# Patient Record
Sex: MALE | Race: WHITE
[De-identification: names, ages, dates, MRNs, and addresses within clinical notes are randomized per-mention and may not be internally consistent; named-entity substitution may affect disease eponyms.]

---

## 2018-10-04 ENCOUNTER — HOSPITAL ENCOUNTER (INPATIENT)
Dept: HOSPITAL 38 - CC.ED | Age: 83
LOS: 5 days | Discharge: HOME | DRG: 291 | End: 2018-10-09
Attending: FAMILY MEDICINE | Admitting: PHYSICIAN ASSISTANT
Payer: MEDICARE

## 2018-10-04 DIAGNOSIS — Z98.61: ICD-10-CM

## 2018-10-04 DIAGNOSIS — F03.90: ICD-10-CM

## 2018-10-04 DIAGNOSIS — E11.22: ICD-10-CM

## 2018-10-04 DIAGNOSIS — Z51.5: ICD-10-CM

## 2018-10-04 DIAGNOSIS — Z79.899: ICD-10-CM

## 2018-10-04 DIAGNOSIS — I48.92: ICD-10-CM

## 2018-10-04 DIAGNOSIS — J18.9: ICD-10-CM

## 2018-10-04 DIAGNOSIS — J44.0: ICD-10-CM

## 2018-10-04 DIAGNOSIS — E87.5: ICD-10-CM

## 2018-10-04 DIAGNOSIS — Z91.013: ICD-10-CM

## 2018-10-04 DIAGNOSIS — I50.23: Primary | ICD-10-CM

## 2018-10-04 DIAGNOSIS — I48.91: ICD-10-CM

## 2018-10-04 DIAGNOSIS — H35.30: ICD-10-CM

## 2018-10-04 DIAGNOSIS — Z79.82: ICD-10-CM

## 2018-10-04 DIAGNOSIS — J69.0: ICD-10-CM

## 2018-10-04 DIAGNOSIS — J44.1: ICD-10-CM

## 2018-10-04 DIAGNOSIS — Z66: ICD-10-CM

## 2018-10-04 DIAGNOSIS — Z87.891: ICD-10-CM

## 2018-10-04 DIAGNOSIS — N18.9: ICD-10-CM

## 2018-10-04 LAB
CHLORIDE SERPL-SCNC: 105 MEQ/L (ref 98–106)
SODIUM SERPL-SCNC: 141 MEQ/L (ref 136–145)

## 2018-10-04 PROCEDURE — G0365 VESSEL MAPPING HEMO ACCESS: HCPCS

## 2018-10-04 RX ADMIN — MOMETASONE FUROATE AND FORMOTEROL FUMARATE DIHYDRATE SCH PUFF: 200; 5 AEROSOL RESPIRATORY (INHALATION) at 20:20

## 2018-10-04 RX ADMIN — CLINDAMYCIN PHOSPHATE SCH MLS/HR: 6 INJECTION, SOLUTION INTRAVENOUS at 20:12

## 2018-10-04 RX ADMIN — CLINDAMYCIN PHOSPHATE SCH MLS/HR: 6 INJECTION, SOLUTION INTRAVENOUS at 14:48

## 2018-10-04 NOTE — EDM.PDOC
ED HPI GENERAL MEDICAL PROBLEM





- General


Chief Complaint: General


Stated Complaint: SOB


Time Seen by Provider: 10/04/18 10:00


Source of Information: Reports: Patient, Family, Nursing Home Records


History Limitations: Reports: Respiratory Distress





- History of Present Illness


INITIAL COMMENTS - FREE TEXT/NARRATIVE: 





Patient presents per [facility] with increased work of breathing, shortness of 

breath, low oxygen sats.  He has been more short of breath over the last week.  

On Friday, was started on prednisone and a Zpack which have been completed.  

Switched to DuoNebs for 7 days.  He is chronically on 2 liters of oxygen but 

has required 5 liters over the last week.  Today, he leaned over on his right 

side and had increased shortness of breath.  Unable to get his sats greater 

than 80% with any movement at all.  EMS gave him a DuoNeb and switched him to 

CPAP on 7 liters.  Was able to get adequate sats in the mid 90s with that.  

Patient is in respiratory distress with tachypnea on arrival but sats are 

adequate with CPAP. 


Onset: Gradual


Duration: Day(s):


Location: Reports: Chest


Severity: Severe


Improves with: Reports: Rest


Worsens with: Reports: Movement


Associated Symptoms: Reports: Cough, Shortness of Breath, Weakness.  Denies: 

Confusion, Chest Pain, Fever/Chills, Nausea/Vomiting


Treatments PTA: Reports: Breathing Treatments, Oxygen





- Related Data


 Allergies











Allergy/AdvReac Type Severity Reaction Status Date / Time


 


MUSHROOMS Allergy  Cannot Uncoded 10/04/18 09:45





   Remember  


 


Seafood Allergy  Airway Uncoded 10/04/18 09:45





   Tightness  











Home Meds: 


 Home Meds





Aspirin [Halfprin] 81 mg PO DAILY 12/12/14 [History]


Multivitamin [Multi-Vitamin Daily] 1 tab PO DAILY 12/12/14 [History]


Albuterol Sulfate [Ventolin Hfa] 2 inh INH QID 04/21/16 [History]


Furosemide 20 mg PO DAILY 04/21/16 [History]


Albuterol/Ipratropium [DuoNeb 3.0-0.5 MG/3 ML] 3 ml NEB QIDRT #28 neb 04/28/16 [

Rx]











Past Medical History


HEENT History: Reports: Macular Degeneration


Cardiovascular History: Reports: Afib


Respiratory History: Reports: COPD


Gastrointestinal History: Reports: Cholelithiasis


Neurological History: Reports: Other (See Below)


Other Neuro History: DEMENTIA


Endocrine/Metabolic History: Reports: Diabetes, Type II





- Past Surgical History


HEENT Surgical History: Reports: Cataract Surgery


Cardiovascular Surgical History: Reports: Percutaneous Transluminal Angioplasty


GI Surgical History: Reports: Cholecystectomy, Colonoscopy, EGD





Social & Family History





- Family History


Family Medical History: Noncontributory





- Tobacco Use


Smoking Status *Q: Former Smoker


Years of Tobacco use: 70


Used Tobacco, but Quit: Yes


Month/Year Tobacco Last Used: 1996





ED ROS GENERAL





- Review of Systems


Review Of Systems: See Below


Constitutional: Reports: Weakness.  Denies: Fever, Chills, Malaise


HEENT: Denies: Ear Pain, Rhinitis, Throat Pain


Respiratory: Reports: Shortness of Breath, Cough


Cardiovascular: Denies: Edema


Endocrine: Reports: Fatigue


GI/Abdominal: Denies: Abdominal Pain, Nausea, Vomiting


: Reports: No Symptoms


Musculoskeletal: Reports: No Symptoms


Skin: Reports: No Symptoms





ED EXAM, GENERAL





- Physical Exam


Exam: See Below


Exam Limited By: No Limitations


General Appearance: Alert, WD/WN, Severe Distress


Ears: Normal External Exam, Normal TMs


Nose: Normal Inspection, Normal Mucosa, No Blood


Throat/Mouth: Normal Inspection, Normal Oropharynx


Head: Normocephalic


Neck: Normal Inspection, Supple


Respiratory/Chest: Respiratory Distress, Decreased Breath Sounds, Rales, 

Wheezing


Cardiovascular: No Edema, Other (atrial flutter noted on cardiac monitor per EMS

, confirmed with EKG)


GI/Abdominal: Normal Bowel Sounds, Soft, Non-Tender


Extremities: Normal Inspection


Neurological: Alert, Oriented


Skin Exam: Warm, Dry





Course





- Vital Signs


Last Recorded V/S: 


 Last Vital Signs











Temp  98.3 F   10/04/18 10:10


 


Pulse  81   10/04/18 10:37


 


Resp  18   10/04/18 10:37


 


BP  123/75   10/04/18 10:10


 


Pulse Ox  92 L  10/04/18 10:37














- Orders/Labs/Meds


Orders: 


 Active Orders 24 hr











 Category Date Time Status


 


 RT Aerosol Therapy [RC] ASDIRECTED Care  10/04/18 10:10 Active


 


 RT Aerosol Therapy [RC] ASDIRECTED Care  10/04/18 10:13 Active


 


 Chest 1V Frontal [CR] Stat Exams  10/04/18 10:03 Taken


 


 methylPREDNISolone Sod Succ [Solu-MEDROL] Med  10/04/18 10:15 Active





 125 mg IVPUSH STAT   








 Medication Orders





Methylprednisolone Sodium Succinate (Solu-Medrol)  125 mg IVPUSH STAT AMANDO


   Last Admin: 10/04/18 10:12  Dose: 125 mg








Labs: 


 Laboratory Tests











  10/04/18 10/04/18 10/04/18 Range/Units





  10:17 10:17 10:17 


 


WBC  13.2 H    (5.0-10.0)  10^3/uL


 


RBC  4.24 L    (4.50-6.00)  10^6/uL


 


Hgb  12.2 L    (14.0-18.0)  g/dL


 


Hct  39.7 L    (40.0-54.0)  %


 


MCV  93.6    (82.0-94.0)  fL


 


MCH  28.8    (27.0-32.0)  pg


 


MCHC  30.7 L    (33.0-38.0)  g/dL


 


RDW Coeff of Tammy  15.7 H    (11.0-15.0)  %


 


Plt Count  316    (150-400)  10^3/uL


 


Add Manual Diff  Yes    


 


Neutrophils % (Manual)  93 H    (35-85)  %


 


Band Neutrophils %  1    (0-5)  %


 


Lymphocytes % (Manual)  3 L    (21-55)  %


 


Monocytes % (Manual)  2    (2-12)  %


 


Eosinophils % (Manual)  1    (0-5)  %


 


Absolute Neutrophils  12.41 H    (1.80-7.00)  10^3/uL


 


Lymphocytes # (Manual)  0.40 L    (1.00-4.80)  10^3/uL


 


Monocytes # (Manual)  0.26    (0.00-0.80)  10^3/uL


 


Eosinophils # (Manual)  0.13    (0.00-0.45)  10^3/uL


 


D-Dimer, Quantitative   3.74 H   (0.00-0.50)  


 


Sodium    141  (136-145)  mEq/L


 


Potassium    5.4 H  (3.5-5.0)  mEq/L


 


Chloride    105  ()  mEq/L


 


Carbon Dioxide    29  (21-32)  mmol/L


 


BUN    79 H* D  (7-18)  mg/dL


 


Creatinine    2.2 H  (0.7-1.3)  mg/dL


 


Est Cr Clr Drug Dosing    17.92  mL/min


 


Estimated GFR (MDRD)    28 L  (>=60)  mL/min


 


Glucose    104 H D  (75-99)  mg/dL


 


Calcium    9.0  (8.4-10.1)  mg/dL


 


Total Bilirubin    1.1 H  (0.0-1.0)  mg/dL


 


AST    37  (15-37)  U/L


 


ALT    72  (12-78)  U/L


 


Alkaline Phosphatase    125 H  ()  U/L


 


Lactate Dehydrogenase    236 H  (100-190)  U/L


 


Troponin I    < 0.017  (0.00-0.06)  ng/mL


 


C-Reactive Protein    16.3 H  (0.2-0.8)  mg/dL


 


NT-Pro-B Natriuret Pep    8195 H  (0-1000)  pg/mL


 


Total Protein    7.9  (6.4-8.2)  g/dL


 


Albumin    3.2 L  (3.4-5.0)  g/dL











Meds: 


Medications











Generic Name Dose Route Start Last Admin





  Trade Name Anu  PRN Reason Stop Dose Admin


 


Methylprednisolone Sodium Succinate  125 mg  10/04/18 10:15  10/04/18 10:12





  Solu-Medrol  IVPUSH   125 mg





  STAT AMANDO   Administration





     





     





     





     














Discontinued Medications














Generic Name Dose Route Start Last Admin





  Trade Name Anu  PRN Reason Stop Dose Admin


 


Albuterol/Ipratropium  Confirm  10/04/18 09:37  10/04/18 10:11





  Duoneb 3.0-0.5 Mg/3 Ml  Administered  10/04/18 09:38  Not Given





  Dose   





  3 ml   





  .ROUTE   





  .STK-MED ONE   





     





     





     





     


 


Albuterol/Ipratropium  3 ml  10/04/18 10:09  10/04/18 10:13





  Duoneb 3.0-0.5 Mg/3 Ml  NEB  10/04/18 10:10  3 ml





  ONETIME ONE   Administration





     





     





     





     


 


Albuterol/Ipratropium  3 ml  10/04/18 10:13  10/04/18 11:06





  Duoneb 3.0-0.5 Mg/3 Ml  NEB  10/04/18 10:14  3 ml





  ONETIME ONE   Administration





     





     





     





     


 


Furosemide  40 mg  10/04/18 11:04  10/04/18 11:09





  Lasix  IVPUSH  10/04/18 11:05  40 mg





  ONETIME ONE   Administration





     





     





     





     


 


Methylprednisolone Sodium Succinate  Confirm  10/04/18 09:49  10/04/18 10:11





  Solu-Medrol  Administered  10/04/18 09:50  Not Given





  Dose   





  125 mg   





  .ROUTE   





  .STK-MED ONE   





     





     





     





     














- Re-Assessments/Exams


Free Text/Narrative Re-Assessment/Exam: 





10/04/18 


Patient given DuoNeb and IV solu Medrol on arrival.  Able to wean him down to 5 

liters and maintains sats at 90-92%.  





Labs reviewed, chest xray.   Show elevated ProBNP, elevated D-Dimer, creatinine 

2.2.  CRP elevated at 16.  WBC 13.2.  D-Dimer 3.74.


Chest xray shows venous congestion.  





Discussed status with son Tylor, questioned if they would like the patient 

transferred due to CHF, questionable PE, kidney failure, hyperkalemia, and 

respiratory distress.  Patient remains adamant about being a DNR, does not want 

intubation.  Family would like treated here if possible.  








Departure





- Departure


Time of Disposition: 12:48


Disposition: Admitted As Inpatient 66


Condition: Undetermined


Clinical Impression: 


 CHF, Congestive heart failure





COPD (chronic obstructive pulmonary disease)


Qualifiers:


 COPD type: COPD with acute exacerbation Qualified Code(s): J44.1 - Chronic 

obstructive pulmonary disease with (acute) exacerbation





Pneumonia


Qualifiers:


 Pneumonia type: due to unspecified organism Laterality: right Lung location: 

lower lobe of lung Qualified Code(s): J18.9 - Pneumonia, unspecified organism








- Discharge Information


*PRESCRIPTION DRUG MONITORING PROGRAM REVIEWED*: No


*COPY OF PRESCRIPTION DRUG MONITORING REPORT IN PATIENT PATIENCE: No


Forms:  ED Department Discharge





- Problem List & Annotations


(1) Pneumonia


SNOMED Code(s): 368148373


   Code(s): J18.9 - PNEUMONIA, UNSPECIFIED ORGANISM   Status: Acute   Priority: 

High   Current Visit: Yes   


Qualifiers: 


   Pneumonia type: aspiration pneumonia   Laterality: bilateral   Lung location

: lower lobe of lung   Qualified Code(s): J18.9 - Pneumonia, unspecified 

organism   





(2) COPD (chronic obstructive pulmonary disease)


SNOMED Code(s): 62392805


   Code(s): J44.9 - CHRONIC OBSTRUCTIVE PULMONARY DISEASE, UNSPECIFIED   Status

: Acute   Priority: High   Current Visit: Yes   


Qualifiers: 


   COPD type: COPD with acute exacerbation   Qualified Code(s): J44.1 - Chronic 

obstructive pulmonary disease with (acute) exacerbation   





(3) CHF, Congestive heart failure


SNOMED Code(s): 63959372


   Code(s): I50.9 - HEART FAILURE, UNSPECIFIED   Status: Acute   Priority: High

   Current Visit: Yes   Annotation/Comment:: Acute on Chronic Systolic 

Congestive Heart Failure


   





- Problem List Review


Problem List Initiated/Reviewed/Updated: Yes





- My Orders


Last 24 Hours: 


My Active Orders





10/04/18 10:03


Chest 1V Frontal [CR] Stat 





10/04/18 10:10


RT Aerosol Therapy [RC] ASDIRECTED 





10/04/18 10:13


RT Aerosol Therapy [RC] ASDIRECTED 





10/04/18 10:15


methylPREDNISolone Sod Succ [Solu-MEDROL]   125 mg IVPUSH STAT 














- Assessment/Plan


Admission H&P: Please use this note as an admission H&P


Last 24 Hours: 


My Active Orders





10/04/18 10:03


Chest 1V Frontal [CR] Stat 





10/04/18 10:10


RT Aerosol Therapy [RC] ASDIRECTED 





10/04/18 10:13


RT Aerosol Therapy [RC] ASDIRECTED 





10/04/18 10:15


methylPREDNISolone Sod Succ [Solu-MEDROL]   125 mg IVPUSH STAT 











Assessment:: 





Palliative Care Patient


Acute on Chronic CHF- systolic


COPD with acute exacerbation


Question PE


Question Aspiration Pneumonia


Hyperkalemia


CKD


Plan: 





Palliative care patient admitted to inpatient care to Dr. Anderson.  IV Lasix for 

acute on chronic CHF.  Will cover him with IV Cleocin for concerns of 

aspiration pneumonia.  Lovenox SQ for questionable PE.  Duplex US of his legs 

to rule out clot as creatinine too high in order to do CTA of chest.  Chronic 

Kidney disease.

## 2018-10-05 LAB
CHLORIDE SERPL-SCNC: 101 MEQ/L (ref 98–106)
SODIUM SERPL-SCNC: 138 MEQ/L (ref 136–145)

## 2018-10-05 RX ADMIN — CLINDAMYCIN PHOSPHATE SCH MLS/HR: 6 INJECTION, SOLUTION INTRAVENOUS at 13:27

## 2018-10-05 RX ADMIN — CLINDAMYCIN PHOSPHATE SCH MLS/HR: 6 INJECTION, SOLUTION INTRAVENOUS at 19:49

## 2018-10-05 RX ADMIN — INSULIN DETEMIR SCH UNITS: 100 INJECTION, SOLUTION SUBCUTANEOUS at 07:46

## 2018-10-05 RX ADMIN — MOMETASONE FUROATE AND FORMOTEROL FUMARATE DIHYDRATE SCH PUFF: 200; 5 AEROSOL RESPIRATORY (INHALATION) at 19:50

## 2018-10-05 RX ADMIN — CLINDAMYCIN PHOSPHATE SCH MLS/HR: 6 INJECTION, SOLUTION INTRAVENOUS at 07:31

## 2018-10-05 RX ADMIN — CLINDAMYCIN PHOSPHATE SCH MLS/HR: 6 INJECTION, SOLUTION INTRAVENOUS at 02:15

## 2018-10-05 RX ADMIN — POLYVINYL ALCOHOL SCH DROP: 14 SOLUTION/ DROPS OPHTHALMIC at 07:40

## 2018-10-05 RX ADMIN — MOMETASONE FUROATE AND FORMOTEROL FUMARATE DIHYDRATE SCH PUFF: 200; 5 AEROSOL RESPIRATORY (INHALATION) at 07:36

## 2018-10-05 NOTE — PCM.PN
- General Info


Date of Service: 10/05/18


Admission Dx/Problem (Free Text): 





Acute on Chronic CHF


Pneumonia


COPD Exacerbation


Elevated D-Dimer


Palliative Care patient


Functional Status: Reports: Pain Controlled, Tolerating Diet, Other (zuniga cath 

intact, draining bloody urine).  Denies: Ambulating





- Review of Systems


General: Reports: Weakness, Fatigue.  Denies: Fever


HEENT: Reports: No Symptoms


Pulmonary: Reports: Shortness of Breath, Cough


Cardiovascular: Denies: Chest Pain, Edema, Lightheadedness


Gastrointestinal: Denies: Abdominal Pain, Nausea, Vomiting


Genitourinary: Reports: Hematuria, Other (intact catheter)


Musculoskeletal: Reports: No Symptoms


Skin: Reports: No Symptoms


Neurological: Reports: Confusion, Weakness


Psychiatric: Reports: No Symptoms





- Patient Data


Vitals - Most Recent: 


 Last Vital Signs











Temp  98.1 F   10/05/18 11:49


 


Pulse  70   10/05/18 11:49


 


Resp  20   10/05/18 11:49


 


BP  124/46 L  10/05/18 11:49


 


Pulse Ox  95   10/05/18 11:49











Weight - Most Recent: 140 lb 9.6 oz


I&O - Last 24 Hours: 


 Intake & Output











 10/04/18 10/05/18 10/05/18





 22:59 06:59 14:59


 


Intake Total 100 650 250


 


Output Total 800 600 250


 


Balance -700 50 0











Lab Results Last 24 Hours: 


 Laboratory Results - last 24 hr











  10/04/18 10/05/18 10/05/18 Range/Units





  21:53 06:50 06:50 


 


WBC   13.9 H   (5.0-10.0)  10^3/uL


 


RBC   3.86 L   (4.50-6.00)  10^6/uL


 


Hgb   11.1 L   (14.0-18.0)  g/dL


 


Hct   35.4 L   (40.0-54.0)  %


 


MCV   91.7   (82.0-94.0)  fL


 


MCH   28.8   (27.0-32.0)  pg


 


MCHC   31.4 L   (33.0-38.0)  g/dL


 


RDW Coeff of Tammy   15.0   (11.0-15.0)  %


 


Plt Count   296   (150-400)  10^3/uL


 


Add Manual Diff   Yes   


 


Neutrophils % (Manual)   92 H   (35-85)  %


 


Lymphocytes % (Manual)   3 L   (21-55)  %


 


Monocytes % (Manual)   5   (2-12)  %


 


Sodium    138  (136-145)  mEq/L


 


Potassium    4.6  (3.5-5.0)  mEq/L


 


Chloride    101  ()  mEq/L


 


Carbon Dioxide    28  (21-32)  mmol/L


 


BUN    87 H*  (7-18)  mg/dL


 


Creatinine    2.3 H  (0.7-1.3)  mg/dL


 


Est Cr Clr Drug Dosing    17.14  mL/min


 


Estimated GFR (MDRD)    27 L  (>=60)  mL/min


 


Glucose    199 H D  (75-99)  mg/dL


 


POC Glucose  253 H    ()  mg/dl


 


Calcium    8.9  (8.4-10.1)  mg/dL


 


C-Reactive Protein    16.4 H  (0.2-0.8)  mg/dL














  10/05/18 10/05/18 Range/Units





  07:21 11:39 


 


WBC    (5.0-10.0)  10^3/uL


 


RBC    (4.50-6.00)  10^6/uL


 


Hgb    (14.0-18.0)  g/dL


 


Hct    (40.0-54.0)  %


 


MCV    (82.0-94.0)  fL


 


MCH    (27.0-32.0)  pg


 


MCHC    (33.0-38.0)  g/dL


 


RDW Coeff of Tammy    (11.0-15.0)  %


 


Plt Count    (150-400)  10^3/uL


 


Add Manual Diff    


 


Neutrophils % (Manual)    (35-85)  %


 


Lymphocytes % (Manual)    (21-55)  %


 


Monocytes % (Manual)    (2-12)  %


 


Sodium    (136-145)  mEq/L


 


Potassium    (3.5-5.0)  mEq/L


 


Chloride    ()  mEq/L


 


Carbon Dioxide    (21-32)  mmol/L


 


BUN    (7-18)  mg/dL


 


Creatinine    (0.7-1.3)  mg/dL


 


Est Cr Clr Drug Dosing    mL/min


 


Estimated GFR (MDRD)    (>=60)  mL/min


 


Glucose    (75-99)  mg/dL


 


POC Glucose  190 H  251 H  ()  mg/dl


 


Calcium    (8.4-10.1)  mg/dL


 


C-Reactive Protein    (0.2-0.8)  mg/dL











Med Orders - Current: 


 Current Medications





Acetaminophen (Tylenol)  650 mg PO Q4H PRN


   PRN Reason: Pain (Mild 1-3)/fever


Acetaminophen (Tylenol Extra Strength)  1,000 mg PO BID [facility]


   Last Admin: 10/05/18 07:36 Dose:  1,000 mg


Albuterol/Ipratropium (Duoneb 3.0-0.5 Mg/3 Ml)  3 ml NEB QIDRT [facility]


   Last Admin: 10/05/18 11:37 Dose:  3 ml


Amlodipine Besylate (Norvasc)  5 mg PO BEDTIME [facility]


   Last Admin: 10/04/18 20:16 Dose:  5 mg


Artificial Tears (Liquitears 1.4% Ophth Soln)  0 ml EYEBOTH DAILY [facility]


   Last Admin: 10/05/18 07:40 Dose:  1 drop


Aspirin (Halfprin)  81 mg PO DAILY [facility]


   Last Admin: 10/05/18 07:34 Dose:  81 mg


Bisacodyl (Dulcolax)  5 mg PO DAILY PRN


   PRN Reason: Constipation


Enoxaparin Sodium (Lovenox)  70 mg SUBCUT DAILY [facility]


   Last Admin: 10/05/18 07:34 Dose:  70 mg


Furosemide (Lasix)  40 mg IVPUSH DAILY [facility]


Clindamycin Phosphate 300 mg/ (Premix)  50 mls @ 100 mls/hr IV Q6H [facility]


   Last Admin: 10/05/18 13:27 Dose:  100 mls/hr


Insulin Detemir (Levemir)  22 unit SUBCUT DAILY [facility]


   Last Admin: 10/05/18 07:46 Dose:  22 units


Mometasone Furoate/Formoterol Fumar (Dulera 200-5 Mcg)  2 puff IH BIDRT [facility]


   Last Admin: 10/05/18 07:36 Dose:  2 puff


Sodium Chloride (Saline Flush)  10 ml FLUSH ASDIRECTED PRN


   PRN Reason: Keep Vein Open





Discontinued Medications





Albuterol/Ipratropium (Duoneb 3.0-0.5 Mg/3 Ml) Confirm Administered Dose 3 ml 

.ROUTE .STK-MED ONE


   Stop: 10/04/18 09:38


   Last Admin: 10/04/18 10:11 Dose:  Not Given


Albuterol/Ipratropium (Duoneb 3.0-0.5 Mg/3 Ml)  3 ml NEB ONETIME ONE


   Stop: 10/04/18 10:10


   Last Admin: 10/04/18 10:13 Dose:  3 ml


Albuterol/Ipratropium (Duoneb 3.0-0.5 Mg/3 Ml)  3 ml NEB ONETIME ONE


   Stop: 10/04/18 10:14


   Last Admin: 10/04/18 11:06 Dose:  3 ml


Enoxaparin Sodium (Lovenox)  60 mg SUBCUT BID [facility]


   Last Admin: 10/04/18 14:26 Dose:  Not Given


Furosemide (Lasix)  40 mg IVPUSH ONETIME ONE


   Stop: 10/04/18 11:05


   Last Admin: 10/04/18 11:09 Dose:  40 mg


Furosemide (Lasix)  40 mg IVPUSH BID [facility]


   Last Admin: 10/05/18 08:05 Dose:  40 mg


Methylprednisolone Sodium Succinate (Solu-Medrol) Confirm Administered Dose 125 

mg .ROUTE .STK-MED ONE


   Stop: 10/04/18 09:50


   Last Admin: 10/04/18 10:11 Dose:  Not Given


Methylprednisolone Sodium Succinate (Solu-Medrol)  125 mg IVPUSH STAT [facility]


   Last Admin: 10/04/18 10:12 Dose:  125 mg











- Exam


Quality Assessment: Supplemental Oxygen


General: Alert, Oriented (person only)


HEENT: Mucous Membr. Moist/Pink


Neck: Supple


Lungs: Decreased Breath Sounds, Crackles, Rhonchi


Cardiovascular: Regular Rate, Regular Rhythm


GI/Abdominal Exam: Normal Bowel Sounds, Soft, Non-Tender


Extremities: Normal Inspection, No Pedal Edema


Skin: Warm, Dry


Neurological: No New Focal Deficit





- Problem List & Annotations


(1) Pneumonia


SNOMED Code(s): 933312260


   Code(s): J18.9 - PNEUMONIA, UNSPECIFIED ORGANISM   Status: Acute   Priority: 

High   Current Visit: Yes   


Qualifiers: 


   Laterality: bilateral   Lung location: lower lobe of lung   Qualified Code(s)

: J18.9 - Pneumonia, unspecified organism   





(2) COPD (chronic obstructive pulmonary disease)


SNOMED Code(s): 95244719


   Code(s): J44.9 - CHRONIC OBSTRUCTIVE PULMONARY DISEASE, UNSPECIFIED   Status

: Acute   Priority: High   Current Visit: Yes   


Qualifiers: 


   COPD type: COPD with acute exacerbation   Qualified Code(s): J44.1 - Chronic 

obstructive pulmonary disease with (acute) exacerbation   





(3) CHF, Congestive heart failure


SNOMED Code(s): 95551077


   Code(s): I50.9 - HEART FAILURE, UNSPECIFIED   Status: Acute   Priority: High

   Current Visit: Yes   Annotation/Comment:: Acute on Chronic Systolic 

Congestive Heart Failure


   





(4) Palliative care patient


SNOMED Code(s): 709104832


   Code(s): Z51.5 - ENCOUNTER FOR PALLIATIVE CARE   Status: Acute   Current 

Visit: Yes   





- Problem List Review


Problem List Initiated/Reviewed/Updated: Yes





- My Orders


Last 24 Hours: 


My Active Orders





10/04/18 13:03


Resuscitation Status Routine 





10/04/18 13:14


Patient Status [ADT] Routine 


Bedrest Bedside Commode [RC] .PRN 


Cardiac Monitoring [RC] 0800,2000 


Oxygen Therapy [RC] 2355 


RT Aerosol Therapy [RC] 0800,1200,1600,2000 


Urinary Catheter Assessment [RC] 0800,2000 


Vital Signs [RC] 0000,0400,0800,1200,1600,2000 


VL Duplex Lwr Ext Veins Comp [US] Routine 


Acetaminophen [Tylenol]   650 mg PO Q4H PRN 


Bisacodyl [Dulcolax]   5 mg PO DAILY PRN 


Sodium Chloride 0.9% [Saline Flush]   10 ml FLUSH ASDIRECTED PRN 


Saline Lock Insert [OM.PC] Routine 





10/04/18 13:15


Insert Urinary Catheter [OM.PC] Q24H 





10/04/18 14:00


Clindamycin Phosphate in D5W [Cleocin in D5W] 300 mg   Premix Bag 1 bag IV Q6H 


Enoxaparin [Lovenox]   70 mg SUBCUT DAILY 





10/04/18 16:00


Albuterol/Ipratropium [DuoNeb 3.0-0.5 MG/3 ML]   3 ml NEB QIDRT 





10/04/18 20:00


Acetaminophen [Tylenol Extra Strength]   1,000 mg PO BID 


Mometasone/Formoterol [Dulera 200-5 MCG]   2 puff IH BIDRT 


amLODIPine [Norvasc]   5 mg PO BEDTIME 





10/04/18 20:30


Blood Glucose Check, Bedside [] 0730,1130,1730,2030 





10/04/18 Dinner


2 Gram Sodium Diet [DIET] 





10/05/18 05:00


Daily Weight [Height and Weight] [RC] 0500 





10/05/18 08:00


Aspirin [Halfprin]   81 mg PO DAILY 


Insulin Detemir [Levemir]   22 unit SUBCUT DAILY 


Polyvinyl Alcohol [LiquiTears 1.4% Ophth Soln]   0 ml EYEBOTH DAILY 





10/05/18 08:05


Chest 2V [CR] Routine 





10/06/18 08:00


Furosemide [Lasix]   40 mg IVPUSH DAILY 














- Assessment


Assessment:: 





Patient is alert today, conversing.  Oriented to person.  Much less respiratory 

distress than yesterday.  On 5 liters of oxygen with sats at 98%.  Afebrile.  

Blood pressure stable.  He has pulled at his catheter so does have bloody 

drainage noted today.  Good urine output noted.  Labs noted to be high but 

stable in comparison to yesterday.  WBC 13.9, Creatinine 2.3, CRP 16.4.  

Ultrasound of legs was normal.  





Will get repeat chest xray today.  Decrease Lasix to 40 mg daily.  Continue IV 

Cleocin.  Repeat labs in am.

## 2018-10-06 LAB
CHLORIDE SERPL-SCNC: 100 MEQ/L (ref 98–106)
SODIUM SERPL-SCNC: 136 MEQ/L (ref 136–145)

## 2018-10-06 RX ADMIN — MOMETASONE FUROATE AND FORMOTEROL FUMARATE DIHYDRATE SCH PUFF: 200; 5 AEROSOL RESPIRATORY (INHALATION) at 08:05

## 2018-10-06 RX ADMIN — CLINDAMYCIN PHOSPHATE SCH MLS/HR: 6 INJECTION, SOLUTION INTRAVENOUS at 13:46

## 2018-10-06 RX ADMIN — CLINDAMYCIN PHOSPHATE SCH MLS/HR: 6 INJECTION, SOLUTION INTRAVENOUS at 07:30

## 2018-10-06 RX ADMIN — POLYVINYL ALCOHOL SCH DROP: 14 SOLUTION/ DROPS OPHTHALMIC at 07:57

## 2018-10-06 RX ADMIN — CLINDAMYCIN PHOSPHATE SCH MLS/HR: 6 INJECTION, SOLUTION INTRAVENOUS at 20:03

## 2018-10-06 RX ADMIN — INSULIN DETEMIR SCH UNITS: 100 INJECTION, SOLUTION SUBCUTANEOUS at 07:58

## 2018-10-06 RX ADMIN — CLINDAMYCIN PHOSPHATE SCH MLS/HR: 6 INJECTION, SOLUTION INTRAVENOUS at 02:15

## 2018-10-06 RX ADMIN — MOMETASONE FUROATE AND FORMOTEROL FUMARATE DIHYDRATE SCH PUFF: 200; 5 AEROSOL RESPIRATORY (INHALATION) at 20:05

## 2018-10-06 NOTE — PCM.PN
- General Info


Date of Service: 10/06/18


Functional Status: Reports: Pain Controlled





- Review of Systems


General: Denies: Fever


HEENT: Reports: No Symptoms


Pulmonary: Reports: Shortness of Breath, Cough


Cardiovascular: Denies: Chest Pain


Gastrointestinal: Denies: Abdominal Pain, Diarrhea, Nausea, Vomiting


Genitourinary: Reports: Hematuria


Musculoskeletal: Denies: Neck Pain


Skin: Reports: No Symptoms


Neurological: Denies: Dizziness, Headache





- Patient Data


Vitals - Most Recent: 


 Last Vital Signs











Temp  36.9 C   10/06/18 11:39


 


Pulse  89   10/06/18 11:39


 


Resp  20   10/06/18 11:39


 


BP  135/56 L  10/06/18 11:39


 


Pulse Ox  95   10/06/18 11:39











Weight - Most Recent: 68.356 kg


I&O - Last 24 Hours: 


 Intake & Output











 10/05/18 10/06/18 10/06/18





 22:59 06:59 14:59


 


Intake Total 485 650 850


 


Output Total 650 500 


 


Balance -165 150 850











Lab Results Last 24 Hours: 


 Laboratory Results - last 24 hr











  10/05/18 10/05/18 10/06/18 Range/Units





  16:54 20:22 06:50 


 


WBC    12.1 H  (5.0-10.0)  10^3/uL


 


RBC    3.77 L  (4.50-6.00)  10^6/uL


 


Hgb    10.8 L  (14.0-18.0)  g/dL


 


Hct    34.3 L  (40.0-54.0)  %


 


MCV    91.0  (82.0-94.0)  fL


 


MCH    28.6  (27.0-32.0)  pg


 


MCHC    31.5 L  (33.0-38.0)  g/dL


 


RDW Coeff of Tammy    15.3 H  (11.0-15.0)  %


 


Plt Count    314  (150-400)  10^3/uL


 


Neut % (Auto)    84.7  (35-85)  %


 


Lymph % (Auto)    5.2 L  (10-55)  %


 


Mono % (Auto)    9.5  (0-16)  %


 


Eos % (Auto)    0.5  (0-5)  %


 


Baso % (Auto)    0.1  (0-3)  %


 


Neut # (Auto)    10.20 H  (1.80-7.00)  10^3/uL


 


Lymph # (Auto)    0.63 L  (1.00-4.80)  10^3/uL


 


Mono # (Auto)    1.15 H  (0.00-0.80)  10^3/uL


 


Eos # (Auto)    0.06  (0.00-0.45)  10^3/uL


 


Baso # (Auto)    0.01  10^3/uL


 


Sodium     (136-145)  mEq/L


 


Potassium     (3.5-5.0)  mEq/L


 


Chloride     ()  mEq/L


 


Carbon Dioxide     (21-32)  mmol/L


 


BUN     (7-18)  mg/dL


 


Creatinine     (0.7-1.3)  mg/dL


 


Est Cr Clr Drug Dosing     mL/min


 


Estimated GFR (MDRD)     (>=60)  mL/min


 


Glucose     (75-99)  mg/dL


 


POC Glucose  152 H  228 H   ()  mg/dl


 


Calcium     (8.4-10.1)  mg/dL


 


C-Reactive Protein     (0.2-0.8)  mg/dL














  10/06/18 10/06/18 10/06/18 Range/Units





  06:50 07:47 11:38 


 


WBC     (5.0-10.0)  10^3/uL


 


RBC     (4.50-6.00)  10^6/uL


 


Hgb     (14.0-18.0)  g/dL


 


Hct     (40.0-54.0)  %


 


MCV     (82.0-94.0)  fL


 


MCH     (27.0-32.0)  pg


 


MCHC     (33.0-38.0)  g/dL


 


RDW Coeff of Tammy     (11.0-15.0)  %


 


Plt Count     (150-400)  10^3/uL


 


Neut % (Auto)     (35-85)  %


 


Lymph % (Auto)     (10-55)  %


 


Mono % (Auto)     (0-16)  %


 


Eos % (Auto)     (0-5)  %


 


Baso % (Auto)     (0-3)  %


 


Neut # (Auto)     (1.80-7.00)  10^3/uL


 


Lymph # (Auto)     (1.00-4.80)  10^3/uL


 


Mono # (Auto)     (0.00-0.80)  10^3/uL


 


Eos # (Auto)     (0.00-0.45)  10^3/uL


 


Baso # (Auto)     10^3/uL


 


Sodium  136    (136-145)  mEq/L


 


Potassium  4.7    (3.5-5.0)  mEq/L


 


Chloride  100    ()  mEq/L


 


Carbon Dioxide  28    (21-32)  mmol/L


 


BUN  103 H*    (7-18)  mg/dL


 


Creatinine  2.4 H    (0.7-1.3)  mg/dL


 


Est Cr Clr Drug Dosing  16.43    mL/min


 


Estimated GFR (MDRD)  26 L    (>=60)  mL/min


 


Glucose  150 H    (75-99)  mg/dL


 


POC Glucose   129 H  211 H  ()  mg/dl


 


Calcium  8.2 L    (8.4-10.1)  mg/dL


 


C-Reactive Protein  10.9 H    (0.2-0.8)  mg/dL











Med Orders - Current: 


 Current Medications





Acetaminophen (Tylenol)  650 mg PO Q4H PRN


   PRN Reason: Pain (Mild 1-3)/fever


Acetaminophen (Tylenol Extra Strength)  1,000 mg PO BID [facility]


   Last Admin: 10/06/18 08:06 Dose:  1,000 mg


Albuterol/Ipratropium (Duoneb 3.0-0.5 Mg/3 Ml)  3 ml NEB QIDRT [facility]


   Last Admin: 10/06/18 11:37 Dose:  3 ml


Amlodipine Besylate (Norvasc)  5 mg PO BEDTIME [facility]


   Last Admin: 10/05/18 19:49 Dose:  5 mg


Artificial Tears (Liquitears 1.4% Ophth Soln)  0 ml EYEBOTH DAILY [facility]


   Last Admin: 10/06/18 07:57 Dose:  1 drop


Aspirin (Halfprin)  81 mg PO DAILY [facility]


   Last Admin: 10/06/18 08:07 Dose:  81 mg


Bisacodyl (Dulcolax)  5 mg PO DAILY PRN


   PRN Reason: Constipation


Enoxaparin Sodium (Lovenox)  70 mg SUBCUT DAILY [facility]


   Last Admin: 10/06/18 07:54 Dose:  70 mg


Furosemide (Lasix)  40 mg IVPUSH DAILY [facility]


   Last Admin: 10/06/18 08:07 Dose:  40 mg


Clindamycin Phosphate 300 mg/ (Premix)  50 mls @ 100 mls/hr IV Q6H [facility]


   Last Admin: 10/06/18 13:46 Dose:  100 mls/hr


Insulin Detemir (Levemir)  22 unit SUBCUT DAILY [facility]


   Last Admin: 10/06/18 07:58 Dose:  22 units


Mometasone Furoate/Formoterol Fumar (Dulera 200-5 Mcg)  2 puff IH BIDRT [facility]


   Last Admin: 10/06/18 08:05 Dose:  2 puff


Sodium Chloride (Saline Flush)  10 ml FLUSH ASDIRECTED PRN


   PRN Reason: Keep Vein Open





Discontinued Medications





Albuterol/Ipratropium (Duoneb 3.0-0.5 Mg/3 Ml) Confirm Administered Dose 3 ml 

.ROUTE .STK-MED ONE


   Stop: 10/04/18 09:38


   Last Admin: 10/04/18 10:11 Dose:  Not Given


Albuterol/Ipratropium (Duoneb 3.0-0.5 Mg/3 Ml)  3 ml NEB ONETIME ONE


   Stop: 10/04/18 10:10


   Last Admin: 10/04/18 10:13 Dose:  3 ml


Albuterol/Ipratropium (Duoneb 3.0-0.5 Mg/3 Ml)  3 ml NEB ONETIME ONE


   Stop: 10/04/18 10:14


   Last Admin: 10/04/18 11:06 Dose:  3 ml


Enoxaparin Sodium (Lovenox)  60 mg SUBCUT BID [facility]


   Last Admin: 10/04/18 14:26 Dose:  Not Given


Furosemide (Lasix)  40 mg IVPUSH ONETIME ONE


   Stop: 10/04/18 11:05


   Last Admin: 10/04/18 11:09 Dose:  40 mg


Furosemide (Lasix)  40 mg IVPUSH BID [facility]


   Last Admin: 10/05/18 08:05 Dose:  40 mg


Methylprednisolone Sodium Succinate (Solu-Medrol) Confirm Administered Dose 125 

mg .ROUTE .STK-MED ONE


   Stop: 10/04/18 09:50


   Last Admin: 10/04/18 10:11 Dose:  Not Given


Methylprednisolone Sodium Succinate (Solu-Medrol)  125 mg IVPUSH STAT [facility]


   Last Admin: 10/04/18 10:12 Dose:  125 mg











- Exam


Quality Assessment: Supplemental Oxygen


General: Alert, Oriented


Lungs: Crackles, Rhonchi


Cardiovascular: Regular Rate, Regular Rhythm


GI/Abdominal Exam: Soft


Peripheral Pulses: 2+: Radial (L), Radial (R)


Skin: Warm, Dry, Intact


Neurological: No New Focal Deficit


Psy/Mental Status: Alert





- Problem List Review


Problem List Initiated/Reviewed/Updated: Yes





- Assessment


Assessment:: 





Patient is alert today, conversing.  Oriented to person.  Much less respiratory 

distress than yesterday.  On 5 liters of oxygen with sats at 98%.  Afebrile.  

Blood pressure stable.  He has pulled at his catheter so does have bloody 

drainage noted today.  Good urine output noted.  Labs noted to be high but 

stable in comparison to yesterday.  WBC 13.9, Creatinine 2.3, CRP 16.4.  

Ultrasound of legs was normal.  





Will get repeat chest xray today.  Decrease Lasix to 40 mg daily.  Continue IV 

Cleocin.  Repeat labs in am.





10/6/18


No significant change from previous exam.  BUN climbing but appears stable. 

Labs otherwise stable. Repeat labs in AM.

## 2018-10-07 LAB
CHLORIDE SERPL-SCNC: 100 MEQ/L (ref 98–106)
SODIUM SERPL-SCNC: 137 MEQ/L (ref 136–145)

## 2018-10-07 RX ADMIN — INSULIN DETEMIR SCH UNITS: 100 INJECTION, SOLUTION SUBCUTANEOUS at 08:06

## 2018-10-07 RX ADMIN — CLINDAMYCIN PHOSPHATE SCH MLS/HR: 6 INJECTION, SOLUTION INTRAVENOUS at 14:16

## 2018-10-07 RX ADMIN — CLINDAMYCIN PHOSPHATE SCH MLS/HR: 6 INJECTION, SOLUTION INTRAVENOUS at 19:53

## 2018-10-07 RX ADMIN — MOMETASONE FUROATE AND FORMOTEROL FUMARATE DIHYDRATE SCH PUFF: 200; 5 AEROSOL RESPIRATORY (INHALATION) at 08:05

## 2018-10-07 RX ADMIN — POLYVINYL ALCOHOL SCH DROP: 14 SOLUTION/ DROPS OPHTHALMIC at 08:05

## 2018-10-07 RX ADMIN — CLINDAMYCIN PHOSPHATE SCH MLS/HR: 6 INJECTION, SOLUTION INTRAVENOUS at 01:45

## 2018-10-07 RX ADMIN — CLINDAMYCIN PHOSPHATE SCH MLS/HR: 6 INJECTION, SOLUTION INTRAVENOUS at 08:04

## 2018-10-07 RX ADMIN — MOMETASONE FUROATE AND FORMOTEROL FUMARATE DIHYDRATE SCH PUFF: 200; 5 AEROSOL RESPIRATORY (INHALATION) at 19:53

## 2018-10-07 NOTE — PCM.PN
- General Info


Date of Service: 10/07/18


Functional Status: Reports: Pain Controlled





- Review of Systems


General: Reports: Weakness, Fatigue.  Denies: Fever


HEENT: Reports: No Symptoms


Pulmonary: Reports: Shortness of Breath, Cough


Cardiovascular: Denies: Chest Pain


Gastrointestinal: Denies: Abdominal Pain, Nausea, Vomiting


Skin: Reports: No Symptoms





- Patient Data


Vitals - Most Recent: 


 Last Vital Signs











Temp  36.7 C   10/07/18 16:00


 


Pulse  69   10/07/18 16:00


 


Resp  20   10/07/18 16:00


 


BP  134/51 L  10/07/18 16:00


 


Pulse Ox  91 L  10/07/18 16:00











Weight - Most Recent: 66.996 kg


I&O - Last 24 Hours: 


 Intake & Output











 10/07/18 10/07/18 10/07/18





 06:59 14:59 22:59


 


Intake Total 524 50 700


 


Output Total 500  1125


 


Balance 24 50 -425











Lab Results Last 24 Hours: 


 Laboratory Results - last 24 hr











  10/06/18 10/06/18 10/07/18 Range/Units





  17:19 20:30 07:00 


 


WBC    10.9 H  (5.0-10.0)  10^3/uL


 


RBC    3.84 L  (4.50-6.00)  10^6/uL


 


Hgb    11.2 L  (14.0-18.0)  g/dL


 


Hct    35.0 L  (40.0-54.0)  %


 


MCV    91.1  (82.0-94.0)  fL


 


MCH    29.2  (27.0-32.0)  pg


 


MCHC    32.0 L  (33.0-38.0)  g/dL


 


RDW Coeff of Tammy    15.3 H  (11.0-15.0)  %


 


Plt Count    339  (150-400)  10^3/uL


 


Add Manual Diff    Yes  


 


Neutrophils % (Manual)    75  (35-85)  %


 


Band Neutrophils %    3  (0-5)  %


 


Lymphocytes % (Manual)    9 L  (21-55)  %


 


Monocytes % (Manual)    7  (2-12)  %


 


Eosinophils % (Manual)    4  (0-5)  %


 


Metamyelocytes %    2  %


 


Sodium     (136-145)  mEq/L


 


Potassium     (3.5-5.0)  mEq/L


 


Chloride     ()  mEq/L


 


Carbon Dioxide     (21-32)  mmol/L


 


BUN     (7-18)  mg/dL


 


Creatinine     (0.7-1.3)  mg/dL


 


Est Cr Clr Drug Dosing     mL/min


 


Estimated GFR (MDRD)     (>=60)  mL/min


 


Glucose     (75-99)  mg/dL


 


POC Glucose  98  161 H   ()  mg/dl


 


Calcium     (8.4-10.1)  mg/dL


 


C-Reactive Protein     (0.2-0.8)  mg/dL














  10/07/18 10/07/18 10/07/18 Range/Units





  07:00 07:39 11:38 


 


WBC     (5.0-10.0)  10^3/uL


 


RBC     (4.50-6.00)  10^6/uL


 


Hgb     (14.0-18.0)  g/dL


 


Hct     (40.0-54.0)  %


 


MCV     (82.0-94.0)  fL


 


MCH     (27.0-32.0)  pg


 


MCHC     (33.0-38.0)  g/dL


 


RDW Coeff of Tammy     (11.0-15.0)  %


 


Plt Count     (150-400)  10^3/uL


 


Add Manual Diff     


 


Neutrophils % (Manual)     (35-85)  %


 


Band Neutrophils %     (0-5)  %


 


Lymphocytes % (Manual)     (21-55)  %


 


Monocytes % (Manual)     (2-12)  %


 


Eosinophils % (Manual)     (0-5)  %


 


Metamyelocytes %     %


 


Sodium  137    (136-145)  mEq/L


 


Potassium  4.9    (3.5-5.0)  mEq/L


 


Chloride  100    ()  mEq/L


 


Carbon Dioxide  30    (21-32)  mmol/L


 


BUN  107 H*    (7-18)  mg/dL


 


Creatinine  2.4 H    (0.7-1.3)  mg/dL


 


Est Cr Clr Drug Dosing  16.43    mL/min


 


Estimated GFR (MDRD)  26 L    (>=60)  mL/min


 


Glucose  145 H    (75-99)  mg/dL


 


POC Glucose   133 H  247 H  ()  mg/dl


 


Calcium  8.8    (8.4-10.1)  mg/dL


 


C-Reactive Protein  7.0 H    (0.2-0.8)  mg/dL














  10/07/18 Range/Units





  17:02 


 


WBC   (5.0-10.0)  10^3/uL


 


RBC   (4.50-6.00)  10^6/uL


 


Hgb   (14.0-18.0)  g/dL


 


Hct   (40.0-54.0)  %


 


MCV   (82.0-94.0)  fL


 


MCH   (27.0-32.0)  pg


 


MCHC   (33.0-38.0)  g/dL


 


RDW Coeff of Tammy   (11.0-15.0)  %


 


Plt Count   (150-400)  10^3/uL


 


Add Manual Diff   


 


Neutrophils % (Manual)   (35-85)  %


 


Band Neutrophils %   (0-5)  %


 


Lymphocytes % (Manual)   (21-55)  %


 


Monocytes % (Manual)   (2-12)  %


 


Eosinophils % (Manual)   (0-5)  %


 


Metamyelocytes %   %


 


Sodium   (136-145)  mEq/L


 


Potassium   (3.5-5.0)  mEq/L


 


Chloride   ()  mEq/L


 


Carbon Dioxide   (21-32)  mmol/L


 


BUN   (7-18)  mg/dL


 


Creatinine   (0.7-1.3)  mg/dL


 


Est Cr Clr Drug Dosing   mL/min


 


Estimated GFR (MDRD)   (>=60)  mL/min


 


Glucose   (75-99)  mg/dL


 


POC Glucose  151 H  ()  mg/dl


 


Calcium   (8.4-10.1)  mg/dL


 


C-Reactive Protein   (0.2-0.8)  mg/dL











Med Orders - Current: 


 Current Medications





Acetaminophen (Tylenol)  650 mg PO Q4H PRN


   PRN Reason: Pain (Mild 1-3)/fever


Acetaminophen (Tylenol Extra Strength)  1,000 mg PO BID [facility]


   Last Admin: 10/07/18 08:04 Dose:  1,000 mg


Albuterol/Ipratropium (Duoneb 3.0-0.5 Mg/3 Ml)  3 ml NEB QIDRT [facility] of [facility]


   Last Admin: 10/07/18 16:13 Dose:  3 ml


Amlodipine Besylate (Norvasc)  5 mg PO BEDTIME [facility] of [facility]


   Last Admin: 10/06/18 20:03 Dose:  5 mg


Artificial Tears (Liquitears 1.4% Ophth Soln)  0 ml EYEBOTH DAILY [facility] of [facility]


   Last Admin: 10/07/18 08:05 Dose:  1 drop


Aspirin (Halfprin)  81 mg PO DAILY [facility]


   Last Admin: 10/07/18 08:04 Dose:  81 mg


Bisacodyl (Dulcolax)  5 mg PO DAILY PRN


   PRN Reason: Constipation


   Last Admin: 10/06/18 20:04 Dose:  5 mg


Enoxaparin Sodium (Lovenox)  70 mg SUBCUT DAILY [facility]


   Last Admin: 10/07/18 08:04 Dose:  70 mg


Furosemide (Lasix)  40 mg IVPUSH DAILY [facility]


   Last Admin: 10/07/18 08:04 Dose:  40 mg


Clindamycin Phosphate 300 mg/ (Premix)  50 mls @ 100 mls/hr IV Q6H [facility] of [facility]


   Last Admin: 10/07/18 14:16 Dose:  100 mls/hr


Insulin Detemir (Levemir)  22 unit SUBCUT DAILY [facility] of [facility]


   Last Admin: 10/07/18 08:06 Dose:  22 units


Mometasone Furoate/Formoterol Fumar (Dulera 200-5 Mcg)  2 puff IH BIDRT [facility] license of [facility]


   Last Admin: 10/07/18 08:05 Dose:  2 puff


Sodium Chloride (Saline Flush)  10 ml FLUSH ASDIRECTED PRN


   PRN Reason: Keep Vein Open





Discontinued Medications





Albuterol/Ipratropium (Duoneb 3.0-0.5 Mg/3 Ml) Confirm Administered Dose 3 ml 

.ROUTE .STK-MED ONE


   Stop: 10/04/18 09:38


   Last Admin: 10/04/18 10:11 Dose:  Not Given


Albuterol/Ipratropium (Duoneb 3.0-0.5 Mg/3 Ml)  3 ml NEB ONETIME ONE


   Stop: 10/04/18 10:10


   Last Admin: 10/04/18 10:13 Dose:  3 ml


Albuterol/Ipratropium (Duoneb 3.0-0.5 Mg/3 Ml)  3 ml NEB ONETIME ONE


   Stop: 10/04/18 10:14


   Last Admin: 10/04/18 11:06 Dose:  3 ml


Enoxaparin Sodium (Lovenox)  60 mg SUBCUT BID [facility] of [facility]


   Last Admin: 10/04/18 14:26 Dose:  Not Given


Furosemide (Lasix)  40 mg IVPUSH ONETIME ONE


   Stop: 10/04/18 11:05


   Last Admin: 10/04/18 11:09 Dose:  40 mg


Furosemide (Lasix)  40 mg IVPUSH BID [facility] of [facility]


   Last Admin: 10/05/18 08:05 Dose:  40 mg


Methylprednisolone Sodium Succinate (Solu-Medrol) Confirm Administered Dose 125 

mg .ROUTE .STK-MED ONE


   Stop: 10/04/18 09:50


   Last Admin: 10/04/18 10:11 Dose:  Not Given


Methylprednisolone Sodium Succinate (Solu-Medrol)  125 mg IVPUSH STAT [facility]


   Last Admin: 10/04/18 10:12 Dose:  125 mg











- Exam


Quality Assessment: Supplemental Oxygen


General: Alert, Oriented


Neck: Supple


Lungs: Decreased Breath Sounds, Wheezing


Cardiovascular: Regular Rate, Regular Rhythm


GI/Abdominal Exam: Soft


Extremities: No Pedal Edema


Peripheral Pulses: 2+: Radial (L), Radial (R)


Skin: Warm, Dry, Intact


Psy/Mental Status: Alert, Normal Affect, Normal Mood





- Problem List Review


Problem List Initiated/Reviewed/Updated: Yes





- Assessment


Assessment:: 





Patient is alert today, conversing.  Oriented to person.  Much less respiratory 

distress than yesterday.  On 5 liters of oxygen with sats at 98%.  Afebrile.  

Blood pressure stable.  He has pulled at his catheter so does have bloody 

drainage noted today.  Good urine output noted.  Labs noted to be high but 

stable in comparison to yesterday.  WBC 13.9, Creatinine 2.3, CRP 16.4.  

Ultrasound of legs was normal.  





Will get repeat chest xray today.  Decrease Lasix to 40 mg daily.  Continue IV 

Cleocin.  Repeat labs in am.





10/6/18


No significant change from previous exam.  BUN climbing but appears stable. 

Labs otherwise stable. Repeat labs in AM.





10/7/18


relatively unchanged from previous exam. BUN remains elevated. Labs are 

improving. WBC near normal. Continues to have bloody drainage in zuniga as he is 

anticoagulated and attempted to pull zuniga on admit.

## 2018-10-08 LAB
CHLORIDE SERPL-SCNC: 101 MEQ/L (ref 98–106)
SODIUM SERPL-SCNC: 137 MEQ/L (ref 136–145)

## 2018-10-08 RX ADMIN — POLYVINYL ALCOHOL SCH DROP: 14 SOLUTION/ DROPS OPHTHALMIC at 07:52

## 2018-10-08 RX ADMIN — INSULIN DETEMIR SCH UNITS: 100 INJECTION, SOLUTION SUBCUTANEOUS at 07:54

## 2018-10-08 RX ADMIN — MOMETASONE FUROATE AND FORMOTEROL FUMARATE DIHYDRATE SCH PUFF: 200; 5 AEROSOL RESPIRATORY (INHALATION) at 19:55

## 2018-10-08 RX ADMIN — CLINDAMYCIN PHOSPHATE SCH MLS/HR: 6 INJECTION, SOLUTION INTRAVENOUS at 01:38

## 2018-10-08 RX ADMIN — MOMETASONE FUROATE AND FORMOTEROL FUMARATE DIHYDRATE SCH PUFF: 200; 5 AEROSOL RESPIRATORY (INHALATION) at 07:50

## 2018-10-08 RX ADMIN — CLINDAMYCIN PHOSPHATE SCH MLS/HR: 6 INJECTION, SOLUTION INTRAVENOUS at 19:55

## 2018-10-08 RX ADMIN — CLINDAMYCIN PHOSPHATE SCH MLS/HR: 6 INJECTION, SOLUTION INTRAVENOUS at 14:06

## 2018-10-08 RX ADMIN — CLINDAMYCIN PHOSPHATE SCH MLS/HR: 6 INJECTION, SOLUTION INTRAVENOUS at 07:42

## 2018-10-08 NOTE — PCM.PN
- General Info


Date of Service: 10/08/18


Admission Dx/Problem (Free Text): 





Acute on Chronic CHF


Pneumonia


COPD Exacerbation


Elevated D-Dimer


Palliative Care patient


Functional Status: Reports: Pain Controlled, Tolerating Diet.  Denies: 

Ambulating





- Review of Systems


General: Reports: Weakness, Fatigue, Malaise.  Denies: Fever


HEENT: Reports: No Symptoms


Pulmonary: Reports: Shortness of Breath, Cough, Wheezing


Cardiovascular: Denies: Chest Pain, Edema, Lightheadedness


Gastrointestinal: Denies: Abdominal Pain, Nausea, Vomiting


Genitourinary: Reports: No Symptoms


Musculoskeletal: Reports: No Symptoms


Skin: Reports: No Symptoms


Neurological: Reports: Confusion





- Patient Data


Vitals - Most Recent: 


 Last Vital Signs











Temp  97.4 F   10/08/18 08:00


 


Pulse  67   10/08/18 08:00


 


Resp  24 H  10/08/18 08:00


 


BP  124/48 L  10/08/18 08:00


 


Pulse Ox  94 L  10/08/18 08:00











Weight - Most Recent: 152 lb 9.6 oz


I&O - Last 24 Hours: 


 Intake & Output











 10/07/18 10/08/18 10/08/18





 22:59 06:59 14:59


 


Intake Total 850 550 


 


Output Total 1125 700 


 


Balance -275 -150 











Lab Results Last 24 Hours: 


 Laboratory Results - last 24 hr











  10/07/18 10/07/18 10/07/18 Range/Units





  11:38 17:02 20:39 


 


WBC     (5.0-10.0)  10^3/uL


 


RBC     (4.50-6.00)  10^6/uL


 


Hgb     (14.0-18.0)  g/dL


 


Hct     (40.0-54.0)  %


 


MCV     (82.0-94.0)  fL


 


MCH     (27.0-32.0)  pg


 


MCHC     (33.0-38.0)  g/dL


 


RDW Coeff of Tammy     (11.0-15.0)  %


 


Plt Count     (150-400)  10^3/uL


 


Add Manual Diff     


 


Neutrophils % (Manual)     (35-85)  %


 


Band Neutrophils %     (0-5)  %


 


Lymphocytes % (Manual)     (21-55)  %


 


Monocytes % (Manual)     (2-12)  %


 


Eosinophils % (Manual)     (0-5)  %


 


Absolute Neutrophils     (1.80-7.00)  10^3/uL


 


Lymphocytes # (Manual)     (1.00-4.80)  10^3/uL


 


Monocytes # (Manual)     (0.00-0.80)  10^3/uL


 


Eosinophils # (Manual)     (0.00-0.45)  10^3/uL


 


Sodium     (136-145)  mEq/L


 


Potassium     (3.5-5.0)  mEq/L


 


Chloride     ()  mEq/L


 


Carbon Dioxide     (21-32)  mmol/L


 


BUN     (7-18)  mg/dL


 


Creatinine     (0.7-1.3)  mg/dL


 


Est Cr Clr Drug Dosing     mL/min


 


Estimated GFR (MDRD)     (>=60)  mL/min


 


Glucose     (75-99)  mg/dL


 


POC Glucose  247 H  151 H  250 H  ()  mg/dl


 


Calcium     (8.4-10.1)  mg/dL


 


C-Reactive Protein     (0.2-0.8)  mg/dL














  10/08/18 10/08/18 10/08/18 Range/Units





  07:52 08:40 08:40 


 


WBC   10.3 H   (5.0-10.0)  10^3/uL


 


RBC   3.59 L   (4.50-6.00)  10^6/uL


 


Hgb   10.6 L   (14.0-18.0)  g/dL


 


Hct   33.2 L   (40.0-54.0)  %


 


MCV   92.5   (82.0-94.0)  fL


 


MCH   29.5   (27.0-32.0)  pg


 


MCHC   31.9 L   (33.0-38.0)  g/dL


 


RDW Coeff of Tammy   15.7 H   (11.0-15.0)  %


 


Plt Count   332   (150-400)  10^3/uL


 


Add Manual Diff   Yes   


 


Neutrophils % (Manual)   84   (35-85)  %


 


Band Neutrophils %   1   (0-5)  %


 


Lymphocytes % (Manual)   5 L   (21-55)  %


 


Monocytes % (Manual)   9   (2-12)  %


 


Eosinophils % (Manual)   1   (0-5)  %


 


Absolute Neutrophils   8.76 H   (1.80-7.00)  10^3/uL


 


Lymphocytes # (Manual)   0.52 L   (1.00-4.80)  10^3/uL


 


Monocytes # (Manual)   0.93 H   (0.00-0.80)  10^3/uL


 


Eosinophils # (Manual)   0.10   (0.00-0.45)  10^3/uL


 


Sodium    137  (136-145)  mEq/L


 


Potassium    5.2 H  (3.5-5.0)  mEq/L


 


Chloride    101  ()  mEq/L


 


Carbon Dioxide    30  (21-32)  mmol/L


 


BUN    94 H*  (7-18)  mg/dL


 


Creatinine    2.3 H  (0.7-1.3)  mg/dL


 


Est Cr Clr Drug Dosing    17.14  mL/min


 


Estimated GFR (MDRD)    27 L  (>=60)  mL/min


 


Glucose    202 H D  (75-99)  mg/dL


 


POC Glucose  116 H    ()  mg/dl


 


Calcium    8.7  (8.4-10.1)  mg/dL


 


C-Reactive Protein    5.8 H  (0.2-0.8)  mg/dL











Med Orders - Current: 


 Current Medications





Acetaminophen (Tylenol)  650 mg PO Q4H PRN


   PRN Reason: Pain (Mild 1-3)/fever


Acetaminophen (Tylenol Extra Strength)  1,000 mg PO BID [facility]


   Last Admin: 10/08/18 07:51 Dose:  1,000 mg


Albuterol/Ipratropium (Duoneb 3.0-0.5 Mg/3 Ml)  3 ml NEB QIDRT [facility]


   Last Admin: 10/08/18 07:47 Dose:  3 ml


Amlodipine Besylate (Norvasc)  5 mg PO BEDTIME [facility]


   Last Admin: 10/07/18 19:53 Dose:  5 mg


Artificial Tears (Liquitears 1.4% Ophth Soln)  0 ml EYEBOTH DAILY [facility]


   Last Admin: 10/08/18 07:52 Dose:  1 drop


Aspirin (Halfprin)  81 mg PO DAILY [facility]


   Last Admin: 10/08/18 07:50 Dose:  81 mg


Bisacodyl (Dulcolax)  5 mg PO DAILY PRN


   PRN Reason: Constipation


   Last Admin: 10/07/18 19:53 Dose:  5 mg


Enoxaparin Sodium (Lovenox)  30 mg SUBCUT DAILY@1200 [facility]


Furosemide (Lasix)  40 mg PO DAILY [facility]


Clindamycin Phosphate 300 mg/ (Premix)  50 mls @ 100 mls/hr IV Q6H [facility]


   Last Admin: 10/08/18 07:42 Dose:  100 mls/hr


Insulin Detemir (Levemir)  22 unit SUBCUT DAILY [facility]


   Last Admin: 10/08/18 07:54 Dose:  22 units


Mometasone Furoate/Formoterol Fumar (Dulera 200-5 Mcg)  2 puff IH BIDRT [facility]


   Last Admin: 10/08/18 07:50 Dose:  2 puff


Sodium Chloride (Saline Flush)  10 ml FLUSH ASDIRECTED PRN


   PRN Reason: Keep Vein Open





Discontinued Medications





Albuterol/Ipratropium (Duoneb 3.0-0.5 Mg/3 Ml) Confirm Administered Dose 3 ml 

.ROUTE .STK-MED ONE


   Stop: 10/04/18 09:38


   Last Admin: 10/04/18 10:11 Dose:  Not Given


Albuterol/Ipratropium (Duoneb 3.0-0.5 Mg/3 Ml)  3 ml NEB ONETIME ONE


   Stop: 10/04/18 10:10


   Last Admin: 10/04/18 10:13 Dose:  3 ml


Albuterol/Ipratropium (Duoneb 3.0-0.5 Mg/3 Ml)  3 ml NEB ONETIME ONE


   Stop: 10/04/18 10:14


   Last Admin: 10/04/18 11:06 Dose:  3 ml


Bisacodyl (Dulcolax)  10 mg RECTAL ONETIME ONE


   Stop: 10/08/18 09:07


   Last Admin: 10/08/18 09:56 Dose:  10 mg


Enoxaparin Sodium (Lovenox)  60 mg SUBCUT BID [facility]


   Last Admin: 10/04/18 14:26 Dose:  Not Given


Enoxaparin Sodium (Lovenox)  70 mg SUBCUT DAILY [facility]


   Last Admin: 10/08/18 07:46 Dose:  70 mg


Furosemide (Lasix)  40 mg IVPUSH ONETIME ONE


   Stop: 10/04/18 11:05


   Last Admin: 10/04/18 11:09 Dose:  40 mg


Furosemide (Lasix)  40 mg IVPUSH BID [facility]


   Last Admin: 10/05/18 08:05 Dose:  40 mg


Furosemide (Lasix)  40 mg IVPUSH DAILY [facility]


   Last Admin: 10/08/18 07:38 Dose:  40 mg


Methylprednisolone Sodium Succinate (Solu-Medrol) Confirm Administered Dose 125 

mg .ROUTE .STK-MED ONE


   Stop: 10/04/18 09:50


   Last Admin: 10/04/18 10:11 Dose:  Not Given


Methylprednisolone Sodium Succinate (Solu-Medrol)  125 mg IVPUSH STAT [facility]


   Last Admin: 10/04/18 10:12 Dose:  125 mg











- Exam


Quality Assessment: Supplemental Oxygen


General: Alert, Oriented (person only), Cooperative


HEENT: Mucous Membr. Moist/Pink


Neck: Supple


Lungs: Decreased Breath Sounds, Crackles (right base)


Cardiovascular: Regular Rate, Regular Rhythm


GI/Abdominal Exam: Normal Bowel Sounds, Soft, Non-Tender


Extremities: Normal Inspection, No Pedal Edema


Skin: Warm, Dry


Neurological: No New Focal Deficit





- Problem List & Annotations


(1) Pneumonia


SNOMED Code(s): 587508179


   Code(s): J18.9 - PNEUMONIA, UNSPECIFIED ORGANISM   Status: Acute   Priority: 

High   Current Visit: Yes   


Qualifiers: 


   Laterality: bilateral   Lung location: lower lobe of lung 





(2) COPD (chronic obstructive pulmonary disease)


SNOMED Code(s): 66002730


   Code(s): J44.9 - CHRONIC OBSTRUCTIVE PULMONARY DISEASE, UNSPECIFIED   Status

: Acute   Priority: High   Current Visit: Yes   


Qualifiers: 


   COPD type: COPD with acute exacerbation   Qualified Code(s): J44.1 - Chronic 

obstructive pulmonary disease with (acute) exacerbation   





(3) CHF, Congestive heart failure


SNOMED Code(s): 66800179


   Code(s): I50.9 - HEART FAILURE, UNSPECIFIED   Status: Acute   Priority: High

   Current Visit: Yes   Annotation/Comment:: Acute on Chronic Systolic 

Congestive Heart Failure


   





(4) Palliative care patient


SNOMED Code(s): 109644440


   Code(s): Z51.5 - ENCOUNTER FOR PALLIATIVE CARE   Status: Acute   Current 

Visit: Yes   





- Problem List Review


Problem List Initiated/Reviewed/Updated: Yes





- My Orders


Last 24 Hours: 


My Active Orders





10/08/18 12:00


Enoxaparin [Lovenox]   30 mg SUBCUT DAILY@1200 





10/09/18 08:00


Furosemide [Lasix]   40 mg PO DAILY 














- Assessment


Assessment:: 





Patient is alert today, conversing.  Oriented to person.  Much less respiratory 

distress than yesterday.  On 5 liters of oxygen with sats at 98%.  Afebrile.  

Blood pressure stable.  He has pulled at his catheter so does have bloody 

drainage noted today.  Good urine output noted.  Labs noted to be high but 

stable in comparison to yesterday.  WBC 13.9, Creatinine 2.3, CRP 16.4.  

Ultrasound of legs was normal.  





Will get repeat chest xray today.  Decrease Lasix to 40 mg daily.  Continue IV 

Cleocin.  Repeat labs in am.





10/6/18


No significant change from previous exam.  BUN climbing but appears stable. 

Labs otherwise stable. Repeat labs in AM.





10/7/18


relatively unchanged from previous exam. BUN remains elevated. Labs are 

improving. WBC near normal. Continues to have bloody drainage in zuniga as he is 

anticoagulated and attempted to pull zuniga on admit.





- Plan


Plan:: 





Patient has stabilized.  Is still requiring 5 liters of oxygen as sats do drop 

with movement yet at times but will maintain over 90%.  He denies any concerns 

this am, oriented to person only but cooperative.  Singing songs this am.  Has 

been afebrile.  Lungs are diminished with crackles in the right base.  WBC 10.3

, stable.  BUN is slightly improved to 94.  Creatinine 2.3.  CRP 5.8.  





Will continue IV Cleocin.  Discontinue zuniga catheter.  Reduce Lovenox to 30 mg 

daily.  Switch IV Lasix to oral.  Possible discharge back to the [facility] tomorrow.

## 2018-10-09 VITALS — DIASTOLIC BLOOD PRESSURE: 55 MMHG | SYSTOLIC BLOOD PRESSURE: 138 MMHG

## 2018-10-09 LAB
CHLORIDE SERPL-SCNC: 102 MEQ/L (ref 98–106)
SODIUM SERPL-SCNC: 137 MEQ/L (ref 136–145)

## 2018-10-09 RX ADMIN — MOMETASONE FUROATE AND FORMOTEROL FUMARATE DIHYDRATE SCH PUFF: 200; 5 AEROSOL RESPIRATORY (INHALATION) at 07:36

## 2018-10-09 RX ADMIN — POLYVINYL ALCOHOL SCH DROP: 14 SOLUTION/ DROPS OPHTHALMIC at 07:44

## 2018-10-09 RX ADMIN — CLINDAMYCIN PHOSPHATE SCH MLS/HR: 6 INJECTION, SOLUTION INTRAVENOUS at 01:38

## 2018-10-09 RX ADMIN — INSULIN DETEMIR SCH UNITS: 100 INJECTION, SOLUTION SUBCUTANEOUS at 07:45

## 2018-10-09 RX ADMIN — CLINDAMYCIN PHOSPHATE SCH MLS/HR: 6 INJECTION, SOLUTION INTRAVENOUS at 07:37

## 2018-10-09 NOTE — PCM.DCSUM1
**Discharge Summary





- Hospital Course


Free Text/Narrative:: 





Patient presented from [facility] with hypoxia, shortness of breath.  He had been 

diagnosed a week prior with an URI and was placed on a course of zithromax and 

prednisone.  Had changed his nebs to DuoNebs.  He was noted to have increased 

shortness of breath and his sats had dropped in to the 70s with any movement 

despite 5 liters of oxygen.  Was placed on CPAP per EMS enroute to [place] 

and his sats were improved in to the 90s.  He was in moderate respiratory 

distress in ER.  IV Solu Medrol, IV Lasix was given with consecutive duonebs 

and were able to keep sats above 90% on 5 liters.  Labs did indicate acute on 

chronic CHF as ProBNP nearly 9000.  D-dimer was elevated at 3.74 but unable to 

do PE study due to elevated creatinine.  Chest xray concerning for pneumonia as 

well.  Admitted and started on Cleocin due to concerns with aspiration.  

Started on IV Lasix.  Hinson cath inserted due to hypoxia with movement.  

Covered with Lovenox for concerns with PE.  Patient DNR





Diagnosis: Stroke: No


Modified Pro Scale: No Symptoms at All


Modified Dover Scale Score: 0





- Discharge Data


Discharge Date: 10/09/18


Discharge Disposition: Home, Self-Care 01


Condition: Fair





- Discharge Diagnosis/Problem(s)


(1) Pneumonia


SNOMED Code(s): 554163766


   ICD Code: J18.9 - PNEUMONIA, UNSPECIFIED ORGANISM   Status: Acute   Priority

: High   


Qualifiers: 


   Laterality: bilateral   Lung location: lower lobe of lung 





(2) COPD (chronic obstructive pulmonary disease)


SNOMED Code(s): 59215959


   ICD Code: J44.9 - CHRONIC OBSTRUCTIVE PULMONARY DISEASE, UNSPECIFIED   Status

: Acute   Priority: High   


Qualifiers: 


   COPD type: COPD with acute exacerbation   Qualified Code(s): J44.1 - Chronic 

obstructive pulmonary disease with (acute) exacerbation   





(3) CHF, Congestive heart failure


SNOMED Code(s): 86970824


   ICD Code: I50.9 - HEART FAILURE, UNSPECIFIED   Status: Acute   Priority: 

High   Problem Details: Acute on Chronic Systolic Congestive Heart Failure


   





(4) Palliative care patient


SNOMED Code(s): 151688641


   ICD Code: Z51.5 - ENCOUNTER FOR PALLIATIVE CARE   Status: Acute   





- Patient Summary/Data


Complications: none


Hospital Course: 





Patient had slow improvement of his status over 5 days at the hospital.  Sats 

remain in the higher 90s with 5 liters but unable to wean off as still drops 

with any movement.  Much better air exchange now.  Still fine wheezing noted at 

times.  Catheter returns have been noted to have gross blood, initially started 

when patient pulled on catheter while trying to crawl out of bed and have 

persisted, most likely due to coverage with Lovenox.  He did have ultrasounds 

of his legs to rule out DVT which were negative so Lovenox was decreased to 

preventative dose at 30 mg daily.  Was covered with IV Lasix BID and able to 

wean down to oral dose daily with ongoing improvement.  His creatinine is 

chronically high and has returned back more to its normal state at 2.2.  CRP 

remains mildly elevated at 6.  WBC stable at 10.  K+ at 5.1.  Patient conversive

, eating well and tolerating transfers better now.  





- Patient Instructions


Diet: Usual Diet as Tolerated


Activity: As Tolerated





- Discharge Plan


*PRESCRIPTION DRUG MONITORING PROGRAM REVIEWED*: No


*COPY OF PRESCRIPTION DRUG MONITORING REPORT IN PATIENT PATIENCE: No


Prescriptions/Med Rec: 


Clindamycin HCl [Cleocin HCl] 300 mg PO QID #28 capsule


Furosemide [Lasix] 40 mg PO DAILY #30 tablet


Polyethylene Glycol 3350 [MiraLAX] 17 gm PO DAILY #30 packet


Home Medications: 


 Home Meds





Aspirin [Halfprin] 81 mg PO DAILY 12/12/14 [History]


Multivitamin [Multi-Vitamin Daily] 1 tab PO DAILY 12/12/14 [History]


Albuterol Sulfate [Ventolin Hfa] 2 inh INH QID 04/21/16 [History]


Albuterol/Ipratropium [DuoNeb 3.0-0.5 MG/3 ML] 3 ml NEB QIDRT #28 neb 04/28/16 [

Rx]


Acetaminophen [Tylenol Extra Strength] 1,000 mg PO BID 10/04/18 [History]


Bisacodyl [Dulcolax] 5 mg PO DAILY PRN 10/04/18 [History]


Dextran 70/Hypromellose [Artificial Tears] 1 each OP DAILY 10/04/18 [History]


Fluticasone/Salmeterol [Advair 250-50 Diskus] 1 each IH BID 10/04/18 [History]


Insulin Detemir [Levemir Flextouch] 22 unit SQ DAILY 10/04/18 [History]


amLODIPine Besylate [Norvasc] 5 mg PO BEDTIME 10/04/18 [History]


Clindamycin HCl [Cleocin HCl] 300 mg PO QID #28 capsule 10/09/18 [Rx]


Furosemide [Lasix] 40 mg PO DAILY #30 tablet 10/09/18 [Rx]


Polyethylene Glycol 3350 [MiraLAX] 17 gm PO DAILY #30 packet 10/09/18 [Rx]








Forms:  ED Department Discharge


Referrals: 


Ruel Anderson MD [Primary Care Provider] - 





- Discharge Summary/Plan Comment


DC Time >30 min.: No





- General Info


Date of Service: 10/09/18


Admission Dx/Problem (Free Text: 





Acute on Chronic CHF


Pneumonia


COPD Exacerbation


Elevated D-Dimer


Palliative Care patient


Functional Status: Reports: Pain Controlled, Tolerating Diet.  Denies: 

Ambulating, Urinating





- Review of Systems


General: Reports: Weakness.  Denies: Fever


HEENT: Denies: Ear Pain, Sinus Congestion, Rhinitis


Pulmonary: Reports: Shortness of Breath


Cardiovascular: Denies: Chest Pain, Edema


Gastrointestinal: Denies: Abdominal Pain, Nausea, Vomiting


Genitourinary: Reports: Hematuria, Retention


Musculoskeletal: Reports: No Symptoms


Skin: Reports: No Symptoms


Neurological: Reports: Pre-Existing Deficit





- Patient Data


Vitals - Most Recent: 


 Last Vital Signs











Temp  97.4 F   10/09/18 07:58


 


Pulse  64   10/09/18 07:58


 


Resp  18   10/09/18 07:58


 


BP  138/55 L  10/09/18 07:58


 


Pulse Ox  100   10/09/18 07:58











Weight - Most Recent: 144 lb 9.6 oz


I&O - Last 24 hours: 


 Intake & Output











 10/09/18 10/09/18 10/09/18





 06:59 14:59 22:59


 


Intake Total 750  


 


Output Total 850  


 


Balance -100  











Lab Results - Last 24 hrs: 


 Laboratory Results - last 24 hr











  10/09/18 10/09/18 Range/Units





  06:59 07:43 


 


Sodium  137   (136-145)  mEq/L


 


Potassium  5.1 H   (3.5-5.0)  mEq/L


 


Chloride  102   ()  mEq/L


 


Carbon Dioxide  32   (21-32)  mmol/L


 


BUN  81 H*   (7-18)  mg/dL


 


Creatinine  2.2 H   (0.7-1.3)  mg/dL


 


Est Cr Clr Drug Dosing  17.92   mL/min


 


Estimated GFR (MDRD)  28 L   (>=60)  mL/min


 


Glucose  90  D   (75-99)  mg/dL


 


POC Glucose   87  ()  mg/dl


 


Calcium  8.5   (8.4-10.1)  mg/dL


 


C-Reactive Protein  6.4 H   (0.2-0.8)  mg/dL











Med Orders - Current: 


 Current Medications








Discontinued Medications





Acetaminophen (Tylenol)  650 mg PO Q4H PRN


   PRN Reason: Pain (Mild 1-3)/fever


Acetaminophen (Tylenol Extra Strength)  1,000 mg PO BID [facility]


   Last Admin: 10/09/18 07:44 Dose:  1,000 mg


Albuterol/Ipratropium (Duoneb 3.0-0.5 Mg/3 Ml) Confirm Administered Dose 3 ml 

.ROUTE .STK-MED ONE


   Stop: 10/04/18 09:38


   Last Admin: 10/04/18 10:11 Dose:  Not Given


Albuterol/Ipratropium (Duoneb 3.0-0.5 Mg/3 Ml)  3 ml NEB ONETIME ONE


   Stop: 10/04/18 10:10


   Last Admin: 10/04/18 10:13 Dose:  3 ml


Albuterol/Ipratropium (Duoneb 3.0-0.5 Mg/3 Ml)  3 ml NEB ONETIME ONE


   Stop: 10/04/18 10:14


   Last Admin: 10/04/18 11:06 Dose:  3 ml


Albuterol/Ipratropium (Duoneb 3.0-0.5 Mg/3 Ml)  3 ml NEB QIDRT [facility]


   Last Admin: 10/09/18 07:37 Dose:  3 ml


Amlodipine Besylate (Norvasc)  5 mg PO BEDTIME [facility]


   Last Admin: 10/08/18 19:56 Dose:  5 mg


Artificial Tears (Liquitears 1.4% Ophth Soln)  0 ml EYEBOTH DAILY [facility]


   Last Admin: 10/09/18 07:44 Dose:  1 drop


Aspirin (Halfprin)  81 mg PO DAILY [facility]


   Last Admin: 10/09/18 07:44 Dose:  81 mg


Bisacodyl (Dulcolax)  5 mg PO DAILY PRN


   PRN Reason: Constipation


   Last Admin: 10/07/18 19:53 Dose:  5 mg


Bisacodyl (Dulcolax)  10 mg RECTAL ONETIME ONE


   Stop: 10/08/18 09:07


   Last Admin: 10/08/18 09:56 Dose:  10 mg


Enoxaparin Sodium (Lovenox)  60 mg SUBCUT BID [facility]


   Last Admin: 10/04/18 14:26 Dose:  Not Given


Enoxaparin Sodium (Lovenox)  70 mg SUBCUT DAILY [facility]


   Last Admin: 10/08/18 07:46 Dose:  70 mg


Enoxaparin Sodium (Lovenox)  30 mg SUBCUT DAILY@1200 [facility]


   Last Admin: 10/08/18 11:35 Dose:  Not Given


Furosemide (Lasix)  40 mg IVPUSH ONETIME ONE


   Stop: 10/04/18 11:05


   Last Admin: 10/04/18 11:09 Dose:  40 mg


Furosemide (Lasix)  40 mg IVPUSH BID [facility]


   Last Admin: 10/05/18 08:05 Dose:  40 mg


Furosemide (Lasix)  40 mg IVPUSH DAILY [facility]


   Last Admin: 10/08/18 07:38 Dose:  40 mg


Furosemide (Lasix)  40 mg PO DAILY [facility]


   Last Admin: 10/09/18 07:44 Dose:  40 mg


Clindamycin Phosphate 300 mg/ (Premix)  50 mls @ 100 mls/hr IV Q6H [facility]


   Last Admin: 10/09/18 07:37 Dose:  100 mls/hr


Insulin Detemir (Levemir)  22 unit SUBCUT DAILY [facility]


   Last Admin: 10/09/18 07:45 Dose:  22 units


Methylprednisolone Sodium Succinate (Solu-Medrol) Confirm Administered Dose 125 

mg .ROUTE .STK-MED ONE


   Stop: 10/04/18 09:50


   Last Admin: 10/04/18 10:11 Dose:  Not Given


Methylprednisolone Sodium Succinate (Solu-Medrol)  125 mg IVPUSH STAT [facility]


   Last Admin: 10/04/18 10:12 Dose:  125 mg


Mometasone Furoate/Formoterol Fumar (Dulera 200-5 Mcg)  2 puff IH BIDRT [facility]


   Last Admin: 10/09/18 07:36 Dose:  2 puff


Polyethylene Glycol (Miralax)  17 gm PO BID [facility]


   Last Admin: 10/09/18 07:37 Dose:  17 gm


Sodium Chloride (Saline Flush)  10 ml FLUSH ASDIRECTED PRN


   PRN Reason: Keep Vein Open











- Exam


Quality Assessment: Reports: Supplemental Oxygen


General: Reports: Alert, Oriented (person only)


HEENT: Reports: Mucous Membr. Moist/Pink


Neck: Reports: Supple


Lungs: Reports: Decreased Breath Sounds


Cardiovascular: Reports: Regular Rate, Regular Rhythm


GI/Abdominal Exam: Normal Bowel Sounds, Soft, Non-Tender


Extremities: Normal Inspection, No Pedal Edema


Skin: Reports: Warm, Dry


Neurological: Reports: No New Focal Deficit

## 2018-10-14 ENCOUNTER — HOSPITAL ENCOUNTER (INPATIENT)
Dept: HOSPITAL 38 - CC.ED | Age: 83
LOS: 4 days | Discharge: NURSING, REHAB OR LONG-TERM CARE | DRG: 682 | End: 2018-10-18
Attending: FAMILY MEDICINE | Admitting: PHYSICIAN ASSISTANT
Payer: MEDICARE

## 2018-10-14 DIAGNOSIS — I48.91: ICD-10-CM

## 2018-10-14 DIAGNOSIS — F03.90: ICD-10-CM

## 2018-10-14 DIAGNOSIS — J44.1: ICD-10-CM

## 2018-10-14 DIAGNOSIS — J18.9: ICD-10-CM

## 2018-10-14 DIAGNOSIS — Z79.899: ICD-10-CM

## 2018-10-14 DIAGNOSIS — Z90.49: ICD-10-CM

## 2018-10-14 DIAGNOSIS — N18.9: ICD-10-CM

## 2018-10-14 DIAGNOSIS — J44.0: ICD-10-CM

## 2018-10-14 DIAGNOSIS — Z79.82: ICD-10-CM

## 2018-10-14 DIAGNOSIS — Z98.61: ICD-10-CM

## 2018-10-14 DIAGNOSIS — N17.9: Primary | ICD-10-CM

## 2018-10-14 DIAGNOSIS — E11.22: ICD-10-CM

## 2018-10-14 DIAGNOSIS — Z79.4: ICD-10-CM

## 2018-10-14 DIAGNOSIS — I50.23: ICD-10-CM

## 2018-10-14 DIAGNOSIS — H35.30: ICD-10-CM

## 2018-10-15 LAB
CHLORIDE SERPL-SCNC: 101 MEQ/L (ref 98–106)
CHLORIDE SERPL-SCNC: 98 MEQ/L (ref 98–106)
SODIUM SERPL-SCNC: 136 MEQ/L (ref 136–145)
SODIUM SERPL-SCNC: 138 MEQ/L (ref 136–145)

## 2018-10-15 RX ADMIN — MOMETASONE FUROATE AND FORMOTEROL FUMARATE DIHYDRATE SCH PUFF: 200; 5 AEROSOL RESPIRATORY (INHALATION) at 19:57

## 2018-10-15 RX ADMIN — MOMETASONE FUROATE AND FORMOTEROL FUMARATE DIHYDRATE SCH PUFF: 200; 5 AEROSOL RESPIRATORY (INHALATION) at 08:22

## 2018-10-15 RX ADMIN — POLYVINYL ALCOHOL SCH DROP: 14 SOLUTION/ DROPS OPHTHALMIC at 08:23

## 2018-10-15 RX ADMIN — INSULIN DETEMIR SCH: 100 INJECTION, SOLUTION SUBCUTANEOUS at 08:29

## 2018-10-15 NOTE — EDM.PDOC
ED HPI GENERAL MEDICAL PROBLEM





- General


Chief Complaint: Respiratory Problem


Stated Complaint: SOB


Time Seen by Provider: 10/14/18 23:25


Source of Information: Reports: EMS, Family, Nursing Home Records


History Limitations: Reports: No Limitations





- History of Present Illness


INITIAL COMMENTS - FREE TEXT/NARRATIVE: 





Patient presents to ER with complaints of hypoxia, shortness of breath.  Staff 

states that his oxygen level had dropped to 84% on 5 liters and they are unable 

to increase it due to him being on a concentrator.  He was recently admitted 

here for CHF, Pneumonia, and renal failure.  He was sent back on Cleocin, 

increased lasix with sats remaining above 90% at that time.  He denies any 

pain.  Does feel short of breath and it worsens with movement.  Family relates 

he has been stable but "not good" through the week.  Family unaware of any 

fevers.  


Onset: Today, Sudden


Duration: Minutes:


Location: Reports: Chest


Improves with: Reports: Rest


Worsens with: Reports: Movement


Associated Symptoms: Reports: Confusion (chronic), Cough, Shortness of Breath, 

Weakness.  Denies: Nausea/Vomiting


Treatments PTA: Reports: Breathing Treatments





- Related Data


 Allergies











Allergy/AdvReac Type Severity Reaction Status Date / Time


 


MUSHROOMS Allergy  Cannot Uncoded 10/15/18 00:10





   Remember  


 


Seafood Allergy  Airway Uncoded 10/15/18 00:10





   Tightness  











Home Meds: 


 Home Meds





Aspirin [Halfprin] 81 mg PO DAILY 12/12/14 [History]


Multivitamin [Multi-Vitamin Daily] 1 tab PO DAILY 12/12/14 [History]


Albuterol Sulfate [Ventolin Hfa] 2 inh INH QID 04/21/16 [History]


Albuterol/Ipratropium [DuoNeb 3.0-0.5 MG/3 ML] 3 ml NEB QIDRT #28 neb 04/28/16 [

Rx]


Acetaminophen [Tylenol Extra Strength] 1,000 mg PO BID 10/04/18 [History]


Bisacodyl [Dulcolax] 5 mg PO DAILY PRN 10/04/18 [History]


Dextran 70/Hypromellose [Artificial Tears] 1 each OP DAILY 10/04/18 [History]


Fluticasone/Salmeterol [Advair 250-50 Diskus] 1 each IH BID 10/04/18 [History]


Insulin Detemir [Levemir Flextouch] 22 unit SQ DAILY 10/04/18 [History]


amLODIPine Besylate [Norvasc] 5 mg PO BEDTIME 10/04/18 [History]


Clindamycin HCl [Cleocin HCl] 300 mg PO QID #28 capsule 10/09/18 [Rx]


Furosemide [Lasix] 40 mg PO DAILY #30 tablet 10/09/18 [Rx]


Polyethylene Glycol 3350 [MiraLAX] 17 gm PO DAILY #30 packet 10/09/18 [Rx]











Past Medical History


HEENT History: Reports: Macular Degeneration


Cardiovascular History: Reports: Afib


Respiratory History: Reports: COPD


Gastrointestinal History: Reports: Cholelithiasis


Neurological History: Reports: Other (See Below)


Other Neuro History: DEMENTIA


Endocrine/Metabolic History: Reports: Diabetes, Type II





- Past Surgical History


HEENT Surgical History: Reports: Cataract Surgery


Cardiovascular Surgical History: Reports: Percutaneous Transluminal Angioplasty


GI Surgical History: Reports: Cholecystectomy, Colonoscopy, EGD





Social & Family History





- Family History


Family Medical History: Noncontributory





ED ROS GENERAL





- Review of Systems


Review Of Systems: ROS reveals no pertinent complaints other than HPI. (patient 

only oriented to person.  Talking about flamingos on the wall and such.  Does 

deny pain.  Admits to feeling short of breath.)


Constitutional: Reports: Malaise, Weakness.  Denies: Fever, Chills


HEENT: Reports: Other (dry mouth)


Respiratory: Reports: Shortness of Breath, Cough


GI/Abdominal: Denies: Abdominal Pain


Neurological: Reports: Confusion





ED EXAM, GENERAL





- Physical Exam


Exam: See Below


Exam Limited By: No Limitations


General Appearance: Alert, WD/WN, Mild Distress


Ears: Normal External Exam, Normal TMs


Nose: Normal Inspection, Normal Mucosa, No Blood


Throat/Mouth: Normal Inspection, Normal Oropharynx, Other (mucous membranes dry)


Head: Normocephalic


Neck: Normal Inspection, Supple, Non-Tender


Respiratory/Chest: Respiratory Distress (mild), Decreased Breath Sounds


Cardiovascular: Regular Rate, Rhythm, No Edema


GI/Abdominal: Normal Bowel Sounds, Soft, Non-Tender


Extremities: Normal Inspection


Neurological: Alert, Oriented (person only)





Course





- Orders/Labs/Meds


Orders: 





 Active Orders 24 hr











 Category Date Time Status


 


 Chest 1V Frontal [CR] Stat Exams  10/14/18 23:37 Taken


 


 C-REACTIVE PROTEIN [CHEM] Stat Lab  10/14/18 23:35 Ordered


 


 CBC WITH AUTO DIFF [HEME] Stat Lab  10/14/18 23:35 Ordered


 


 COMPREHENSIVE METABOLIC PN,CMP [CHEM] Stat Lab  10/14/18 23:35 Ordered


 


 CREATINE KINASE,CK [CHEM] Stat Lab  10/14/18 23:35 Ordered


 


 LACTATE DEHYDROGENASE,LDH [CHEM] Stat Lab  10/14/18 23:35 Ordered


 


 MANUAL DIFFERENTIAL QA/NC [HEME] Stat Lab  10/14/18 11:50 Results


 


 PRO B-TYPE NATRIUR PEPT,BNPPRO [CHEM] Stat Lab  10/14/18 23:35 Ordered


 


 TROPONIN I [CHEM] Stat Lab  10/14/18 23:35 Ordered











Labs: 





 Laboratory Tests











  10/14/18 10/14/18 Range/Units





  11:50 11:50 


 


WBC  13.0 H   (5.0-10.0)  10^3/uL


 


RBC  3.11 L   (4.50-6.00)  10^6/uL


 


Hgb  9.1 L   (14.0-18.0)  g/dL


 


Hct  29.1 L   (40.0-54.0)  %


 


MCV  93.6   (82.0-94.0)  fL


 


MCH  29.3   (27.0-32.0)  pg


 


MCHC  31.3 L   (33.0-38.0)  g/dL


 


RDW Coeff of Tammy  16.2 H   (11.0-15.0)  %


 


Plt Count  380   (150-400)  10^3/uL


 


Add Manual Diff  Yes   


 


D-Dimer, Quantitative   1.56 H  (0.00-0.50)  














- Re-Assessments/Exams


Free Text/Narrative Re-Assessment/Exam: 





10/15/18 00:30


Patient requiring 6 liters by mask but keeps oxygen sats in the mid 90s.  Labs 

noted.  Creatinine increased to 3.0, up from previous visit.  Chest xray shows 

probable infiltrate RLL, venous congestion consistent with CHF.  





Departure





- Departure


Time of Disposition: 00:31


Disposition: Admitted As Inpatient 66


Condition: Poor


Clinical Impression: 


 Palliative care patient, CHF, Congestive heart failure, Renal failure (ARF), 

acute on chronic





COPD (chronic obstructive pulmonary disease)


Qualifiers:


 COPD type: COPD with acute exacerbation Qualified Code(s): J44.1 - Chronic 

obstructive pulmonary disease with (acute) exacerbation





Pneumonia


Qualifiers:


 Laterality: bilateral Lung location: lower lobe of lung 








- Discharge Information


*PRESCRIPTION DRUG MONITORING PROGRAM REVIEWED*: No


*COPY OF PRESCRIPTION DRUG MONITORING REPORT IN PATIENT PATIENCE: No





- Problem List & Annotations


(1) Palliative care patient


SNOMED Code(s): 526616157


   Code(s): Z51.5 - ENCOUNTER FOR PALLIATIVE CARE   Status: Acute   Priority: 

High   





(2) Pneumonia


SNOMED Code(s): 729359991


   Code(s): J18.9 - PNEUMONIA, UNSPECIFIED ORGANISM   Status: Acute   Priority: 

High   


Qualifiers: 


   Laterality: right   Lung location: lower lobe of lung 





(3) COPD (chronic obstructive pulmonary disease)


SNOMED Code(s): 70333225


   Code(s): J44.9 - CHRONIC OBSTRUCTIVE PULMONARY DISEASE, UNSPECIFIED   Status

: Acute   Priority: High   


Qualifiers: 


   COPD type: COPD with acute exacerbation   Qualified Code(s): J44.1 - Chronic 

obstructive pulmonary disease with (acute) exacerbation   





(4) CHF, Congestive heart failure


SNOMED Code(s): 05161797


   Code(s): I50.9 - HEART FAILURE, UNSPECIFIED   Status: Acute   Priority: High

   Annotation/Comment:: Acute on Chronic Systolic Congestive Heart Failure


   





(5) Renal failure (ARF), acute on chronic


SNOMED Code(s): 997331499


   Code(s): N17.9 - ACUTE KIDNEY FAILURE, UNSPECIFIED; N18.9 - CHRONIC KIDNEY 

DISEASE, UNSPECIFIED   Status: Acute   





- Problem List Review


Problem List Initiated/Reviewed/Updated: Yes





- My Orders


Last 24 Hours: 





My Active Orders





10/14/18 11:50


MANUAL DIFFERENTIAL QA/NC [HEME] Stat 





10/14/18 23:35


C-REACTIVE PROTEIN [CHEM] Stat 


CBC WITH AUTO DIFF [HEME] Stat 


COMPREHENSIVE METABOLIC PN,CMP [CHEM] Stat 


CREATINE KINASE,CK [CHEM] Stat 


LACTATE DEHYDROGENASE,LDH [CHEM] Stat 


PRO B-TYPE NATRIUR PEPT,BNPPRO [CHEM] Stat 


TROPONIN I [CHEM] Stat 





10/14/18 23:37


Chest 1V Frontal [CR] Stat 














- Assessment/Plan


Admission H&P: Please use this note as an admission H&P


Last 24 Hours: 





My Active Orders





10/14/18 11:50


MANUAL DIFFERENTIAL QA/NC [HEME] Stat 





10/14/18 23:35


C-REACTIVE PROTEIN [CHEM] Stat 


CBC WITH AUTO DIFF [HEME] Stat 


COMPREHENSIVE METABOLIC PN,CMP [CHEM] Stat 


CREATINE KINASE,CK [CHEM] Stat 


LACTATE DEHYDROGENASE,LDH [CHEM] Stat 


PRO B-TYPE NATRIUR PEPT,BNPPRO [CHEM] Stat 


TROPONIN I [CHEM] Stat 





10/14/18 23:37


Chest 1V Frontal [CR] Stat 











Plan: 





Will admit inpatient.  Cautious IV fluids due to increased renal insufficiency.

  IV Lasix for CHF.  Levaquin for pneumonia.  Bobbs.

## 2018-10-16 LAB
CHLORIDE SERPL-SCNC: 103 MEQ/L (ref 98–106)
SODIUM SERPL-SCNC: 144 MEQ/L (ref 136–145)

## 2018-10-16 RX ADMIN — INSULIN DETEMIR SCH: 100 INJECTION, SOLUTION SUBCUTANEOUS at 07:57

## 2018-10-16 RX ADMIN — POLYVINYL ALCOHOL SCH DROP: 14 SOLUTION/ DROPS OPHTHALMIC at 08:05

## 2018-10-16 RX ADMIN — MOMETASONE FUROATE AND FORMOTEROL FUMARATE DIHYDRATE SCH PUFF: 200; 5 AEROSOL RESPIRATORY (INHALATION) at 19:35

## 2018-10-16 RX ADMIN — Medication PRN APPLIC: at 15:00

## 2018-10-16 RX ADMIN — MOMETASONE FUROATE AND FORMOTEROL FUMARATE DIHYDRATE SCH PUFF: 200; 5 AEROSOL RESPIRATORY (INHALATION) at 08:05

## 2018-10-16 NOTE — PCM.PN
- General Info


Date of Service: 10/16/18


Admission Dx/Problem (Free Text): 





RLL Pneumonia


COPD Exacerbation


CHF


Acute on chronic CHF


Palliative care patient





Functional Status: Reports: Pain Controlled, Tolerating Diet





- Review of Systems


General: Reports: Weakness, Fatigue, Malaise.  Denies: Fever


HEENT: Reports: Rhinitis


Pulmonary: Reports: Shortness of Breath, Cough, Sputum


Cardiovascular: Reports: No Symptoms


Gastrointestinal: Denies: Abdominal Pain, Nausea, Vomiting


Genitourinary: Reports: Frequency, Incontinence


Musculoskeletal: Reports: No Symptoms


Skin: Reports: No Symptoms


Neurological: Reports: Confusion





- Patient Data


Vitals - Most Recent: 


 Last Vital Signs











Temp  97.3 F   10/16/18 08:00


 


Pulse  83   10/16/18 08:00


 


Resp  24 H  10/16/18 08:00


 


BP  131/48 L  10/16/18 08:00


 


Pulse Ox  92 L  10/16/18 08:00











Weight - Most Recent: 151 lb 8 oz


I&O - Last 24 Hours: 


 Intake & Output











 10/15/18 10/16/18 10/16/18





 22:59 06:59 14:59


 


Output Total 25  


 


Balance -25  











Lab Results Last 24 Hours: 


 Laboratory Results - last 24 hr











  10/15/18 10/15/18 10/15/18 Range/Units





  11:19 17:15 19:42 


 


WBC     (5.0-10.0)  10^3/uL


 


RBC     (4.50-6.00)  10^6/uL


 


Hgb     (14.0-18.0)  g/dL


 


Hct     (40.0-54.0)  %


 


MCV     (82.0-94.0)  fL


 


MCH     (27.0-32.0)  pg


 


MCHC     (33.0-38.0)  g/dL


 


RDW Coeff of Tammy     (11.0-15.0)  %


 


Plt Count     (150-400)  10^3/uL


 


Neut % (Auto)     (35-85)  %


 


Lymph % (Auto)     (10-55)  %


 


Mono % (Auto)     (0-16)  %


 


Eos % (Auto)     (0-5)  %


 


Baso % (Auto)     (0-3)  %


 


Neut # (Auto)     (1.80-7.00)  10^3/uL


 


Lymph # (Auto)     (1.00-4.80)  10^3/uL


 


Mono # (Auto)     (0.00-0.80)  10^3/uL


 


Eos # (Auto)     (0.00-0.45)  10^3/uL


 


Baso # (Auto)     10^3/uL


 


Sodium     (136-145)  mEq/L


 


Potassium     (3.5-5.0)  mEq/L


 


Chloride     ()  mEq/L


 


Carbon Dioxide     (21-32)  mmol/L


 


BUN     (7-18)  mg/dL


 


Creatinine     (0.7-1.3)  mg/dL


 


Est Cr Clr Drug Dosing     mL/min


 


Estimated GFR (MDRD)     (>=60)  mL/min


 


Glucose     (75-99)  mg/dL


 


POC Glucose  135 H  84  123 H  ()  mg/dl


 


Calcium     (8.4-10.1)  mg/dL


 


C-Reactive Protein     (0.2-0.8)  mg/dL














  10/16/18 10/16/18 10/16/18 Range/Units





  05:30 07:42 08:35 


 


WBC    10.0  (5.0-10.0)  10^3/uL


 


RBC    3.30 L  (4.50-6.00)  10^6/uL


 


Hgb    9.4 L  (14.0-18.0)  g/dL


 


Hct    31.3 L  (40.0-54.0)  %


 


MCV    94.8 H  (82.0-94.0)  fL


 


MCH    28.5  (27.0-32.0)  pg


 


MCHC    30.0 L  (33.0-38.0)  g/dL


 


RDW Coeff of Tammy    16.2 H  (11.0-15.0)  %


 


Plt Count    379  (150-400)  10^3/uL


 


Neut % (Auto)    79.6  (35-85)  %


 


Lymph % (Auto)    7.6 L  (10-55)  %


 


Mono % (Auto)    8.5  (0-16)  %


 


Eos % (Auto)    3.6  (0-5)  %


 


Baso % (Auto)    0.7  (0-3)  %


 


Neut # (Auto)    8.00 H  (1.80-7.00)  10^3/uL


 


Lymph # (Auto)    0.76 L  (1.00-4.80)  10^3/uL


 


Mono # (Auto)    0.85 H  (0.00-0.80)  10^3/uL


 


Eos # (Auto)    0.36  (0.00-0.45)  10^3/uL


 


Baso # (Auto)    0.07  10^3/uL


 


Sodium     (136-145)  mEq/L


 


Potassium     (3.5-5.0)  mEq/L


 


Chloride     ()  mEq/L


 


Carbon Dioxide     (21-32)  mmol/L


 


BUN     (7-18)  mg/dL


 


Creatinine     (0.7-1.3)  mg/dL


 


Est Cr Clr Drug Dosing     mL/min


 


Estimated GFR (MDRD)     (>=60)  mL/min


 


Glucose     (75-99)  mg/dL


 


POC Glucose  78  83   ()  mg/dl


 


Calcium     (8.4-10.1)  mg/dL


 


C-Reactive Protein     (0.2-0.8)  mg/dL














  10/16/18 Range/Units





  08:35 


 


WBC   (5.0-10.0)  10^3/uL


 


RBC   (4.50-6.00)  10^6/uL


 


Hgb   (14.0-18.0)  g/dL


 


Hct   (40.0-54.0)  %


 


MCV   (82.0-94.0)  fL


 


MCH   (27.0-32.0)  pg


 


MCHC   (33.0-38.0)  g/dL


 


RDW Coeff of Tammy   (11.0-15.0)  %


 


Plt Count   (150-400)  10^3/uL


 


Neut % (Auto)   (35-85)  %


 


Lymph % (Auto)   (10-55)  %


 


Mono % (Auto)   (0-16)  %


 


Eos % (Auto)   (0-5)  %


 


Baso % (Auto)   (0-3)  %


 


Neut # (Auto)   (1.80-7.00)  10^3/uL


 


Lymph # (Auto)   (1.00-4.80)  10^3/uL


 


Mono # (Auto)   (0.00-0.80)  10^3/uL


 


Eos # (Auto)   (0.00-0.45)  10^3/uL


 


Baso # (Auto)   10^3/uL


 


Sodium  144  (136-145)  mEq/L


 


Potassium  4.7  (3.5-5.0)  mEq/L


 


Chloride  103  ()  mEq/L


 


Carbon Dioxide  30  (21-32)  mmol/L


 


BUN  69 H  (7-18)  mg/dL


 


Creatinine  2.8 H*  (0.7-1.3)  mg/dL


 


Est Cr Clr Drug Dosing  14.08  mL/min


 


Estimated GFR (MDRD)  21 L  (>=60)  mL/min


 


Glucose  108 H D  (75-99)  mg/dL


 


POC Glucose   ()  mg/dl


 


Calcium  8.6  (8.4-10.1)  mg/dL


 


C-Reactive Protein  12.7 H  (0.2-0.8)  mg/dL











Usman Results Last 24 Hours: 


 Microbiology











 10/15/18 08:57 Gram Stain - Final





 Sputum - Expectorated 











Med Orders - Current: 


 Current Medications





Acetaminophen (Tylenol)  650 mg PO Q4H PRN


   PRN Reason: Pain (Mild 1-3)/fever


Acetaminophen (Tylenol Extra Strength)  1,000 mg PO BID [facility]


   Last Admin: 10/16/18 07:43 Dose:  1,000 mg


Albuterol/Ipratropium (Duoneb 3.0-0.5 Mg/3 Ml)  3 ml NEB Q4H PRN


   PRN Reason: Shortness Of Breath/wheezing


Albuterol/Ipratropium (Duoneb 3.0-0.5 Mg/3 Ml)  3 ml NEB QIDRT [facility]


   Last Admin: 10/16/18 07:41 Dose:  3 ml


Alprazolam (Xanax)  0.25 mg PO Q6H PRN


   PRN Reason: Anxiety


   Last Admin: 10/15/18 19:53 Dose:  0.25 mg


Amlodipine Besylate (Norvasc)  5 mg PO BEDTIME [facility]


   Last Admin: 10/16/18 04:48 Dose:  Not Given


Artificial Tears (Liquitears 1.4% Ophth Soln)  0 ml EYEBOTH DAILY [facility]


   Last Admin: 10/16/18 08:05 Dose:  1 drop


Aspirin (Halfprin)  81 mg PO DAILY [facility]


   Last Admin: 10/16/18 07:43 Dose:  81 mg


Bisacodyl (Dulcolax)  5 mg PO DAILY PRN


   PRN Reason: Constipation


   Last Admin: 10/15/18 07:33 Dose:  5 mg


Enoxaparin Sodium (Lovenox)  30 mg SUBCUT Q24H [facility]


   Last Admin: 10/16/18 00:36 Dose:  30 mg


Furosemide (Lasix)  40 mg IVPUSH BID [facility]


   Last Admin: 10/16/18 07:44 Dose:  40 mg


Piperacillin Sod/Tazobactam (Sod 2.25 gm/ Sodium Chloride)  50 mls @ 100 mls/hr 

IV Q6H [facility]


   Last Admin: 10/16/18 03:30 Dose:  100 mls/hr


Insulin Detemir (Levemir)  0 unit SUBCUT DAILY [facility]


   Last Admin: 10/16/18 07:57 Dose:  Not Given


Mometasone Furoate/Formoterol Fumar (Dulera 200-5 Mcg)  0 puff IH BID [facility]


   Last Admin: 10/16/18 08:05 Dose:  2 puff


Ondansetron HCl (Zofran)  4 mg IV Q4H PRN


   PRN Reason: Nausea/Vomiting


Polyethylene Glycol (Miralax)  17 gm PO DAILY [facility]


   Last Admin: 10/16/18 07:43 Dose:  17 gm


Sodium Chloride (Saline Flush)  10 ml FLUSH ASDIRECTED PRN


   PRN Reason: Keep Vein Open


Vancomycin HCl (Pharmacy To Dose - Vancomycin)  1 dose .XX ASDIRECTED [facility]





Discontinued Medications





Albuterol (Ventolin Hfa)  0 gm INH QID [facility]


   Last Admin: 10/15/18 08:28 Dose:  Not Given


Ceftriaxone Sodium (Rocephin)  1 gm IVPUSH Q24H [facility]


   Last Admin: 10/15/18 02:05 Dose:  1 gm


Levofloxacin/Dextrose 250 mg/ (Premix)  50 mls @ 50 mls/hr IV Q24H [facility]


   Last Admin: 10/15/18 01:36 Dose:  Not Given


Sodium Chloride (Sodium Chloride 0.45%)  1,000 mls @ 75 mls/hr IV ASDIRECTED [facility]


   Last Admin: 10/15/18 02:08 Dose:  75 mls/hr


Azithromycin 500 mg/ Sodium (Chloride)  250 mls @ 250 mls/hr IV Q24H [facility]


   Last Admin: 10/15/18 02:15 Dose:  250 mls/hr


Piperacillin Sod/Tazobactam (Sod 3.375 gm/ Sodium Chloride)  50 mls @ 100 mls/

hr IV Q6H [facility]


   Last Admin: 10/15/18 09:34 Dose:  Not Given











- Exam


General: Alert, Oriented (person only)


HEENT: Mucous Membr. Moist/Pink


Neck: Supple


Lungs: Decreased Breath Sounds, Rales (RLL)


Cardiovascular: Regular Rate, Regular Rhythm


GI/Abdominal Exam: Normal Bowel Sounds, Soft, Non-Tender


Extremities: Normal Inspection, No Pedal Edema


Skin: Warm, Dry


Neurological: No New Focal Deficit





- Problem List & Annotations


(1) Palliative care patient


SNOMED Code(s): 012970928


   Code(s): Z51.5 - ENCOUNTER FOR PALLIATIVE CARE   Status: Acute   Priority: 

High   Current Visit: Yes   





(2) Pneumonia


SNOMED Code(s): 790122807


   Code(s): J18.9 - PNEUMONIA, UNSPECIFIED ORGANISM   Status: Acute   Priority: 

High   Current Visit: Yes   


Qualifiers: 


   Laterality: right   Lung location: lower lobe of lung 





(3) COPD (chronic obstructive pulmonary disease)


SNOMED Code(s): 96751339


   Code(s): J44.9 - CHRONIC OBSTRUCTIVE PULMONARY DISEASE, UNSPECIFIED   Status

: Acute   Priority: High   Current Visit: Yes   


Qualifiers: 


   COPD type: COPD with acute exacerbation   Qualified Code(s): J44.1 - Chronic 

obstructive pulmonary disease with (acute) exacerbation   





(4) CHF, Congestive heart failure


SNOMED Code(s): 75621108


   Code(s): I50.9 - HEART FAILURE, UNSPECIFIED   Status: Acute   Priority: High

   Current Visit: Yes   Annotation/Comment:: Acute on Chronic Systolic 

Congestive Heart Failure


   





(5) Renal failure (ARF), acute on chronic


SNOMED Code(s): 789952474


   Code(s): N17.9 - ACUTE KIDNEY FAILURE, UNSPECIFIED; N18.9 - CHRONIC KIDNEY 

DISEASE, UNSPECIFIED   Status: Acute   Priority: High   Current Visit: Yes   





- Problem List Review


Problem List Initiated/Reviewed/Updated: Yes





- My Orders


Last 24 Hours: 


My Active Orders





10/15/18 08:57


CULTURE SPUTUM + SMEAR [RM] Routine 





10/15/18 09:04


ALPRAZolam [Xanax]   0.25 mg PO Q6H PRN 





10/15/18 09:30


Piperacillin/Tazobactam [Zosyn] 2.25 gm   Sodium Chloride 0.9% [Normal Saline] 

50 ml IV Q6H 





10/15/18 20:00


amLODIPine [Norvasc]   5 mg PO BEDTIME 





10/16/18 09:15


Vancomycin Pharmacy to Dose [Pharmacy to Dose - Vancomycin]   1 dose .XX 

ASDIRECTED 














- Assessment


Assessment:: 





RLL Pneumonia


COPD Exacerbation


Acute on Chronic systolic heart failure


Acute on chronic kidney failure


Palliative Care Patient








- Plan


Plan:: 





Luigi is a palliative care patient admitted from [facility] with increased shortness of 

breath and hypoxia.  Work up in the ER indicated worsening renal failure.  

Chest xray shows venous congestion, probable RLL pneumonia.  He has been alert 

since admission, oriented to person but conversive and cooperative.  Activity 

of any kind does increase hypoxia.  Has frequent cough, productive of thick 

sputum.  Initial sputum culture is positive.  Awaiting ID.  Lab does show WBC 

of 10 today, CRP 12.7.  Creatinine slightly improved to 2.8.  Blood pressure is 

low at 101/39.  





Will hold Norvasc at this time.  Start Vancomycin in addition to the Zosyn.  

Oxygen to maintain sats.  Repeat labs in am.  Social service consult with 

family tomorrow to consider hospice.

## 2018-10-17 LAB
CHLORIDE SERPL-SCNC: 105 MEQ/L (ref 98–106)
SODIUM SERPL-SCNC: 146 MEQ/L (ref 136–145)

## 2018-10-17 RX ADMIN — Medication PRN APPLIC: at 07:35

## 2018-10-17 RX ADMIN — MOMETASONE FUROATE AND FORMOTEROL FUMARATE DIHYDRATE SCH PUFF: 200; 5 AEROSOL RESPIRATORY (INHALATION) at 07:34

## 2018-10-17 RX ADMIN — Medication PRN APPLIC: at 00:21

## 2018-10-17 RX ADMIN — Medication PRN APPLIC: at 21:34

## 2018-10-17 RX ADMIN — MOMETASONE FUROATE AND FORMOTEROL FUMARATE DIHYDRATE SCH PUFF: 200; 5 AEROSOL RESPIRATORY (INHALATION) at 19:35

## 2018-10-17 RX ADMIN — INSULIN DETEMIR SCH UNITS: 100 INJECTION, SOLUTION SUBCUTANEOUS at 07:34

## 2018-10-17 RX ADMIN — POLYVINYL ALCOHOL SCH DROP: 14 SOLUTION/ DROPS OPHTHALMIC at 07:36

## 2018-10-17 NOTE — PCM.PN
- General Info


Date of Service: 10/17/18


Admission Dx/Problem (Free Text): 





RLL Pneumonia


COPD Exacerbation


CHF


Acute on chronic CHF


Palliative care patient





Functional Status: Reports: Pain Controlled, Tolerating Diet, Urinating.  Denies

: Ambulating





- Review of Systems


General: Reports: Weakness, Fatigue, Malaise.  Denies: Fever


HEENT: Reports: Rhinitis


Pulmonary: Reports: Shortness of Breath, Cough, Sputum


Cardiovascular: Denies: Chest Pain, Edema, Lightheadedness


Gastrointestinal: Denies: Abdominal Pain, Nausea


Genitourinary: Reports: Frequency, Incontinence


Neurological: Reports: Confusion





- Patient Data


Vitals - Most Recent: 


 Last Vital Signs











Temp  96 F   10/17/18 08:00


 


Pulse  64   10/17/18 08:00


 


Resp  24 H  10/17/18 08:00


 


BP  134/50 L  10/17/18 08:00


 


Pulse Ox  91 L  10/17/18 08:00











Weight - Most Recent: 149 lb 9.6 oz


Lab Results Last 24 Hours: 


 Laboratory Results - last 24 hr











  10/16/18 10/16/18 10/16/18 Range/Units





  11:26 17:23 19:48 


 


WBC     (5.0-10.0)  10^3/uL


 


RBC     (4.50-6.00)  10^6/uL


 


Hgb     (14.0-18.0)  g/dL


 


Hct     (40.0-54.0)  %


 


MCV     (82.0-94.0)  fL


 


MCH     (27.0-32.0)  pg


 


MCHC     (33.0-38.0)  g/dL


 


RDW Coeff of Tammy     (11.0-15.0)  %


 


Plt Count     (150-400)  10^3/uL


 


Neut % (Auto)     (35-85)  %


 


Lymph % (Auto)     (10-55)  %


 


Mono % (Auto)     (0-16)  %


 


Eos % (Auto)     (0-5)  %


 


Baso % (Auto)     (0-3)  %


 


Neut # (Auto)     (1.80-7.00)  10^3/uL


 


Lymph # (Auto)     (1.00-4.80)  10^3/uL


 


Mono # (Auto)     (0.00-0.80)  10^3/uL


 


Eos # (Auto)     (0.00-0.45)  10^3/uL


 


Baso # (Auto)     10^3/uL


 


Sodium     (136-145)  mEq/L


 


Potassium     (3.5-5.0)  mEq/L


 


Chloride     ()  mEq/L


 


Carbon Dioxide     (21-32)  mmol/L


 


BUN     (7-18)  mg/dL


 


Creatinine     (0.7-1.3)  mg/dL


 


Est Cr Clr Drug Dosing     mL/min


 


Estimated GFR (MDRD)     (>=60)  mL/min


 


Glucose     (75-99)  mg/dL


 


POC Glucose  198 H  154 H  233 H  ()  mg/dl


 


Calcium     (8.4-10.1)  mg/dL


 


C-Reactive Protein     (0.2-0.8)  mg/dL














  10/17/18 10/17/18 10/17/18 Range/Units





  07:04 07:04 07:19 


 


WBC  9.0    (5.0-10.0)  10^3/uL


 


RBC  2.96 L    (4.50-6.00)  10^6/uL


 


Hgb  8.8 L    (14.0-18.0)  g/dL


 


Hct  28.4 L    (40.0-54.0)  %


 


MCV  95.9 H    (82.0-94.0)  fL


 


MCH  29.7    (27.0-32.0)  pg


 


MCHC  31.0 L    (33.0-38.0)  g/dL


 


RDW Coeff of Tammy  16.2 H    (11.0-15.0)  %


 


Plt Count  369    (150-400)  10^3/uL


 


Neut % (Auto)  81.0    (35-85)  %


 


Lymph % (Auto)  5.8 L    (10-55)  %


 


Mono % (Auto)  8.9    (0-16)  %


 


Eos % (Auto)  3.7    (0-5)  %


 


Baso % (Auto)  0.6    (0-3)  %


 


Neut # (Auto)  7.29 H    (1.80-7.00)  10^3/uL


 


Lymph # (Auto)  0.52 L    (1.00-4.80)  10^3/uL


 


Mono # (Auto)  0.80    (0.00-0.80)  10^3/uL


 


Eos # (Auto)  0.33    (0.00-0.45)  10^3/uL


 


Baso # (Auto)  0.05    10^3/uL


 


Sodium   146 H   (136-145)  mEq/L


 


Potassium   4.1   (3.5-5.0)  mEq/L


 


Chloride   105   ()  mEq/L


 


Carbon Dioxide   34 H   (21-32)  mmol/L


 


BUN   62 H   (7-18)  mg/dL


 


Creatinine   2.7 H*   (0.7-1.3)  mg/dL


 


Est Cr Clr Drug Dosing   14.60   mL/min


 


Estimated GFR (MDRD)   22 L   (>=60)  mL/min


 


Glucose   112 H   (75-99)  mg/dL


 


POC Glucose    110 H  ()  mg/dl


 


Calcium   8.6   (8.4-10.1)  mg/dL


 


C-Reactive Protein   8.9 H   (0.2-0.8)  mg/dL











Usman Results Last 24 Hours: 


 Microbiology











 10/15/18 08:57 Gram Stain - Final





 Sputum - Expectorated Sputum Culture - Preliminary





    Gram Positive Rods











Med Orders - Current: 


 Current Medications





Acetaminophen (Tylenol)  650 mg PO Q4H PRN


   PRN Reason: Pain (Mild 1-3)/fever


Acetaminophen (Tylenol Extra Strength)  1,000 mg PO BID [facility]


   Last Admin: 10/17/18 07:31 Dose:  1,000 mg


Albuterol/Ipratropium (Duoneb 3.0-0.5 Mg/3 Ml)  3 ml NEB Q4H PRN


   PRN Reason: Shortness Of Breath/wheezing


   Last Admin: 10/16/18 17:55 Dose:  3 ml


Albuterol/Ipratropium (Duoneb 3.0-0.5 Mg/3 Ml)  3 ml NEB QIDRT [facility]


   Last Admin: 10/17/18 07:36 Dose:  3 ml


Alprazolam (Xanax)  0.25 mg PO Q6H PRN


   PRN Reason: Anxiety


   Last Admin: 10/17/18 07:33 Dose:  0.25 mg


Amlodipine Besylate (Norvasc)  5 mg PO BEDTIME [facility]


   Last Admin: 10/16/18 04:48 Dose:  Not Given


Artificial Tears (Liquitears 1.4% Ophth Soln)  0 ml EYEBOTH DAILY [facility]


   Last Admin: 10/17/18 07:36 Dose:  1 drop


Aspirin (Halfprin)  81 mg PO DAILY [facility]


   Last Admin: 10/17/18 07:32 Dose:  81 mg


Bisacodyl (Dulcolax)  5 mg PO DAILY PRN


   PRN Reason: Constipation


   Last Admin: 10/15/18 07:33 Dose:  5 mg


Enoxaparin Sodium (Lovenox)  30 mg SUBCUT Q24H [facility]


   Last Admin: 10/17/18 07:33 Dose:  30 mg


Furosemide (Lasix)  40 mg IVPUSH BID [facility]


   Last Admin: 10/17/18 07:33 Dose:  40 mg


Piperacillin Sod/Tazobactam (Sod 2.25 gm/ Sodium Chloride)  50 mls @ 100 mls/hr 

IV Q6H [facility]


   Last Admin: 10/17/18 03:38 Dose:  100 mls/hr


Vancomycin HCl 1 gm/ Sodium (Chloride)  250 mls @ 166.667 mls/hr IV Q48H [facility]


   Last Admin: 10/16/18 10:28 Dose:  166.667 mls/hr


Insulin Detemir (Levemir)  0 unit SUBCUT DAILY [facility]


   Last Admin: 10/17/18 07:34 Dose:  22 units


Mometasone Furoate/Formoterol Fumar (Dulera 200-5 Mcg)  0 puff IH BID [facility]


   Last Admin: 10/17/18 07:34 Dose:  2 puff


Ondansetron HCl (Zofran)  4 mg IV Q4H PRN


   PRN Reason: Nausea/Vomiting


Oxyquinoline Sulfate (Bag Balm Oint)  0 oz TOP ASDIRECTED PRN


   PRN Reason: skin breakdown


   Last Admin: 10/17/18 07:35 Dose:  1 applic


Polyethylene Glycol (Miralax)  17 gm PO DAILY PRN


   PRN Reason: Constipation


Sodium Chloride (Saline Flush)  10 ml FLUSH ASDIRECTED PRN


   PRN Reason: Keep Vein Open


Vancomycin HCl (Pharmacy To Dose - Vancomycin)  1 dose .XX ASDIRECTED [facility]





Discontinued Medications





Albuterol (Ventolin Hfa)  0 gm INH QID [facility]


   Last Admin: 10/15/18 08:28 Dose:  Not Given


Ceftriaxone Sodium (Rocephin)  1 gm IVPUSH Q24H [facility]


   Last Admin: 10/15/18 02:05 Dose:  1 gm


Enoxaparin Sodium (Lovenox)  30 mg SUBCUT Q24H [facility]


   Last Admin: 10/16/18 00:36 Dose:  30 mg


Levofloxacin/Dextrose 250 mg/ (Premix)  50 mls @ 50 mls/hr IV Q24H [facility]


   Last Admin: 10/15/18 01:36 Dose:  Not Given


Sodium Chloride (Sodium Chloride 0.45%)  1,000 mls @ 75 mls/hr IV ASDIRECTED [facility]


   Last Admin: 10/15/18 02:08 Dose:  75 mls/hr


Azithromycin 500 mg/ Sodium (Chloride)  250 mls @ 250 mls/hr IV Q24H [facility]


   Last Admin: 10/15/18 02:15 Dose:  250 mls/hr


Piperacillin Sod/Tazobactam (Sod 3.375 gm/ Sodium Chloride)  50 mls @ 100 mls/

hr IV Q6H [facility]


   Last Admin: 10/15/18 09:34 Dose:  Not Given


Piperacillin Sod/Tazobactam Sod (Zosyn) Confirm Administered Dose 3.375 gm 

.ROUTE .STK-MED ONE


   Stop: 10/16/18 21:32


   Last Admin: 10/16/18 21:32 Dose:  Not Given


Piperacillin Sod/Tazobactam Sod (Zosyn) Confirm Administered Dose 3.375 gm 

.ROUTE .STK-MED ONE


   Stop: 10/17/18 02:51


   Last Admin: 10/17/18 06:28 Dose:  Not Given


Polyethylene Glycol (Miralax)  17 gm PO DAILY [facility]


   Last Admin: 10/16/18 07:43 Dose:  17 gm











- Exam


Quality Assessment: Supplemental Oxygen


General: Alert, Oriented (person only, long term memory recall is more adequate)


HEENT: Mucous Membr. Moist/Pink


Neck: Supple


Lungs: Decreased Breath Sounds, Crackles, Wheezing


Cardiovascular: Regular Rate, Regular Rhythm


GI/Abdominal Exam: Normal Bowel Sounds, Soft, Non-Tender


Extremities: Normal Inspection, No Pedal Edema


Skin: Warm, Dry





- Problem List & Annotations


(1) Palliative care patient


SNOMED Code(s): 712570172


   Code(s): Z51.5 - ENCOUNTER FOR PALLIATIVE CARE   Status: Acute   Priority: 

High   Current Visit: Yes   





(2) Pneumonia


SNOMED Code(s): 012707628


   Code(s): J18.9 - PNEUMONIA, UNSPECIFIED ORGANISM   Status: Acute   Priority: 

High   Current Visit: Yes   


Qualifiers: 


   Laterality: right   Lung location: lower lobe of lung 





(3) COPD (chronic obstructive pulmonary disease)


SNOMED Code(s): 93761293


   Code(s): J44.9 - CHRONIC OBSTRUCTIVE PULMONARY DISEASE, UNSPECIFIED   Status

: Acute   Priority: High   Current Visit: Yes   


Qualifiers: 


   COPD type: COPD with acute exacerbation   Qualified Code(s): J44.1 - Chronic 

obstructive pulmonary disease with (acute) exacerbation   





(4) CHF, Congestive heart failure


SNOMED Code(s): 24544162


   Code(s): I50.9 - HEART FAILURE, UNSPECIFIED   Status: Acute   Priority: High

   Current Visit: Yes   Annotation/Comment:: Acute on Chronic Systolic 

Congestive Heart Failure


   





(5) Renal failure (ARF), acute on chronic


SNOMED Code(s): 522848586


   Code(s): N17.9 - ACUTE KIDNEY FAILURE, UNSPECIFIED; N18.9 - CHRONIC KIDNEY 

DISEASE, UNSPECIFIED   Status: Acute   Priority: High   Current Visit: Yes   





- Problem List Review


Problem List Initiated/Reviewed/Updated: Yes





- My Orders


Last 24 Hours: 


My Active Orders





10/16/18 09:15


Vancomycin Pharmacy to Dose [Pharmacy to Dose - Vancomycin]   1 dose .XX 

ASDIRECTED 





10/16/18 09:30


Air Mattress [Pressure Reduction Mattress] [OM.PC] Routine 





10/16/18 10:00


Vancomycin 1 gm   Sodium Chloride 0.9% [Normal Saline] 250 ml IV Q48H 





10/16/18 14:57


Oxyquinoline/Emollient [Bag Balm Oint]   See Dose Instructions  TOP ASDIRECTED 

PRN 





10/16/18 16:22


Polyethylene Glycol 3350 [MiraLAX]   17 gm PO DAILY PRN 





10/17/18 08:00


Enoxaparin [Lovenox]   30 mg SUBCUT Q24H 





10/20/18 09:30


VANCOMYCIN TROUGH [CHEM] Routine 














- Assessment


Assessment:: 





RLL Pneumonia


COPD Exacerbation


Acute on Chronic systolic heart failure


Acute on chronic kidney failure


Palliative Care Patient








- Plan


Plan:: 





Luigi is a palliative care patient admitted from [facility] with increased shortness of 

breath and hypoxia.  Work up in the ER indicated worsening renal failure.  

Chest xray shows venous congestion, probable RLL pneumonia.  He has been alert 

since admission, oriented to person but conversive and cooperative.  Activity 

of any kind does increase hypoxia.  Has frequent cough, productive of thick 

sputum.  Initial sputum culture is positive.  Awaiting ID.  Lab does show WBC 

of 10 today, CRP 12.7.  Creatinine slightly improved to 2.8.  Blood pressure is 

low at 101/39.  





Will hold Norvasc at this time.  Start Vancomycin in addition to the Zosyn.  

Oxygen to maintain sats.  Repeat labs in am.  Social service consult with 

family tomorrow to consider hospice.  





10-


Patient conversive, disoriented to place and time.  Does admit to feeling short 

of breath.  Does have pursed lip breathing after any movements, eating, etc.  

He does drop his sats in to the low 80s with eating.  Still has moist 

productive cough with thick phlegm production at times.  Afebrile.  HLabs today 

show stable WBC of 9.0.  Hemoglobin 8.8.  CRP 8.9.  Creatinine slowly improving

, today 2.7.  Sputum was positive for gram positive cocci, sent to [facility] for 

identification.  





Will continue Vancomycin and Zosyn.  Meeting with family today to discuss 

hospice when he returns back to the nursing home.  Continue other current cares.

## 2018-10-18 ENCOUNTER — HOSPITAL ENCOUNTER (INPATIENT)
Dept: HOSPITAL 38 - CC.MS | Age: 83
LOS: 7 days | DRG: 193 | End: 2018-10-25
Attending: FAMILY MEDICINE | Admitting: FAMILY MEDICINE
Payer: MEDICARE

## 2018-10-18 VITALS — DIASTOLIC BLOOD PRESSURE: 55 MMHG | SYSTOLIC BLOOD PRESSURE: 131 MMHG

## 2018-10-18 DIAGNOSIS — J44.0: ICD-10-CM

## 2018-10-18 DIAGNOSIS — Z79.4: ICD-10-CM

## 2018-10-18 DIAGNOSIS — Z79.899: ICD-10-CM

## 2018-10-18 DIAGNOSIS — N17.9: ICD-10-CM

## 2018-10-18 DIAGNOSIS — I13.0: ICD-10-CM

## 2018-10-18 DIAGNOSIS — J18.9: Primary | ICD-10-CM

## 2018-10-18 DIAGNOSIS — Z79.82: ICD-10-CM

## 2018-10-18 DIAGNOSIS — Z66: ICD-10-CM

## 2018-10-18 DIAGNOSIS — Z87.01: ICD-10-CM

## 2018-10-18 DIAGNOSIS — I50.23: ICD-10-CM

## 2018-10-18 DIAGNOSIS — R09.02: ICD-10-CM

## 2018-10-18 DIAGNOSIS — Z51.5: ICD-10-CM

## 2018-10-18 DIAGNOSIS — N18.9: ICD-10-CM

## 2018-10-18 DIAGNOSIS — J44.1: ICD-10-CM

## 2018-10-18 RX ADMIN — MOMETASONE FUROATE AND FORMOTEROL FUMARATE DIHYDRATE SCH PUFF: 200; 5 AEROSOL RESPIRATORY (INHALATION) at 07:59

## 2018-10-18 RX ADMIN — POLYVINYL ALCOHOL SCH DROP: 14 SOLUTION/ DROPS OPHTHALMIC at 07:59

## 2018-10-18 RX ADMIN — INSULIN DETEMIR SCH UNITS: 100 INJECTION, SOLUTION SUBCUTANEOUS at 07:59

## 2018-10-18 RX ADMIN — Medication PRN APPLIC: at 14:30

## 2018-10-18 RX ADMIN — MOMETASONE FUROATE AND FORMOTEROL FUMARATE DIHYDRATE SCH PUFF: 200; 5 AEROSOL RESPIRATORY (INHALATION) at 19:59

## 2018-10-19 RX ADMIN — Medication PRN APPLIC: at 11:43

## 2018-10-19 RX ADMIN — MOMETASONE FUROATE AND FORMOTEROL FUMARATE DIHYDRATE SCH PUFF: 200; 5 AEROSOL RESPIRATORY (INHALATION) at 20:03

## 2018-10-19 RX ADMIN — POLYVINYL ALCOHOL SCH DROP: 14 SOLUTION/ DROPS OPHTHALMIC at 07:47

## 2018-10-19 RX ADMIN — INSULIN DETEMIR SCH UNIT: 100 INJECTION, SOLUTION SUBCUTANEOUS at 07:49

## 2018-10-19 RX ADMIN — MOMETASONE FUROATE AND FORMOTEROL FUMARATE DIHYDRATE SCH PUFF: 200; 5 AEROSOL RESPIRATORY (INHALATION) at 07:49

## 2018-10-20 LAB
CHLORIDE SERPL-SCNC: 103 MEQ/L (ref 98–106)
SODIUM SERPL-SCNC: 144 MEQ/L (ref 136–145)

## 2018-10-20 RX ADMIN — INSULIN DETEMIR SCH UNIT: 100 INJECTION, SOLUTION SUBCUTANEOUS at 07:58

## 2018-10-20 RX ADMIN — Medication PRN APPLIC: at 08:00

## 2018-10-20 RX ADMIN — MOMETASONE FUROATE AND FORMOTEROL FUMARATE DIHYDRATE SCH PUFF: 200; 5 AEROSOL RESPIRATORY (INHALATION) at 07:58

## 2018-10-20 RX ADMIN — POLYVINYL ALCOHOL SCH DROP: 14 SOLUTION/ DROPS OPHTHALMIC at 07:57

## 2018-10-20 RX ADMIN — MOMETASONE FUROATE AND FORMOTEROL FUMARATE DIHYDRATE SCH PUFF: 200; 5 AEROSOL RESPIRATORY (INHALATION) at 19:30

## 2018-10-21 RX ADMIN — MOMETASONE FUROATE AND FORMOTEROL FUMARATE DIHYDRATE SCH PUFF: 200; 5 AEROSOL RESPIRATORY (INHALATION) at 19:48

## 2018-10-21 RX ADMIN — MOMETASONE FUROATE AND FORMOTEROL FUMARATE DIHYDRATE SCH PUFF: 200; 5 AEROSOL RESPIRATORY (INHALATION) at 08:47

## 2018-10-21 RX ADMIN — INSULIN DETEMIR SCH UNIT: 100 INJECTION, SOLUTION SUBCUTANEOUS at 08:50

## 2018-10-21 RX ADMIN — POLYVINYL ALCOHOL SCH DROP: 14 SOLUTION/ DROPS OPHTHALMIC at 08:48

## 2018-10-21 NOTE — [id number].DCSUM1
**Discharge Summary





- Hospital Course


Free Text/Narrative:: 





Patient presented to ED from the [facility] with hypoxia.  Patient had recently been 

admitted here with Acute CHF, Renal insufficiency, Pneumonia.  Was sent back to 

the home on Cleocin, increased Lasix and oxygen at 5 liters.  Prior to 

readmission, patient's sats had again dropped below 90% with any movement while 

on 5 liters.  Patient still had ongoing cough.  Chest xray showed RLL 

pneumonia.  Creatinine had increased to 3.0 from his usual at 2.3.  Admitted 

with IV fluids, Lasix, Rocephin and Zithromax.  Ongoing nebs.  


Diagnosis: Stroke: No


Modified Mackay Scale: No Symptoms at All


Modified Pro Scale Score: 0





- Discharge Data


Discharge Date: 10/18/18


Discharge Disposition: DC/Tfer W/I Hosp To [facility]


Condition: Poor





- Discharge Diagnosis/Problem(s)


(1) Palliative care patient


SNOMED Code(s): 694481725


   ICD Code: Z51.5 - ENCOUNTER FOR PALLIATIVE CARE   Status: Acute   Priority: 

High   





(2) Pneumonia


SNOMED Code(s): 885045767


   ICD Code: J18.9 - PNEUMONIA, UNSPECIFIED ORGANISM   Status: Acute   Priority

: High   


Qualifiers: 


   Laterality: right   Lung location: lower lobe of lung 





(3) COPD (chronic obstructive pulmonary disease)


SNOMED Code(s): 67040031


   ICD Code: J44.9 - CHRONIC OBSTRUCTIVE PULMONARY DISEASE, UNSPECIFIED   Status

: Acute   Priority: High   


Qualifiers: 


   COPD type: COPD with acute exacerbation   Qualified Code(s): J44.1 - Chronic 

obstructive pulmonary disease with (acute) exacerbation   





(4) CHF, Congestive heart failure


SNOMED Code(s): 46281981


   ICD Code: I50.9 - HEART FAILURE, UNSPECIFIED   Status: Acute   Priority: 

High   Problem Details: Acute on Chronic Systolic Congestive Heart Failure


   





(5) Renal failure (ARF), acute on chronic


SNOMED Code(s): 415317248


   ICD Code: N17.9 - ACUTE KIDNEY FAILURE, UNSPECIFIED; N18.9 - CHRONIC KIDNEY 

DISEASE, UNSPECIFIED   Status: Acute   Priority: High   





- Patient Summary/Data


Complications: none


Hospital Course: 





Patient has had minimal improvement thus far.  Continue to have ongoing cough 

with thick sputum production.  Is weak.  Requires oxygen per mask at 6 liters 

to keep sats greater than 90%.  Labs have improved.  WBC now normal at 9.  

Creatinine somewhat improved to 2.7.  Sputum difficult to identify and was sent 

out to [facility].  Awaiting identification and sensitivities.  Antibiotics were 

switched to Vancomycin and Zosyn when appeared sputum initially noted to have 

gram positive rods.  Family meeting was held during admission in regards to 

status and questions regarding hospice.  At this point, still actively treating 

for pneumonia so will wait to determine how he responds to treatment.  Family 

did give up his bed at the nursing home so will transfer to swing bed for 

ongoing IV antibiotic.





- Discharge Plan


*PRESCRIPTION DRUG MONITORING PROGRAM REVIEWED*: No


*COPY OF PRESCRIPTION DRUG MONITORING REPORT IN PATIENT PATIENCE: No


Home Medications: 


 Home Meds





Aspirin [Halfprin] 81 mg PO DAILY 12/12/14 [History]


Multivitamin [Multi-Vitamin Daily] 1 tab PO DAILY 12/12/14 [History]


Albuterol Sulfate [Ventolin Hfa] 2 inh INH QID 04/21/16 [History]


Albuterol/Ipratropium [DuoNeb 3.0-0.5 MG/3 ML] 3 ml NEB QIDRT #28 neb 04/28/16 [

Rx]


Acetaminophen [Tylenol Extra Strength] 1,000 mg PO BID 10/04/18 [History]


Bisacodyl [Dulcolax] 5 mg PO DAILY PRN 10/04/18 [History]


Dextran 70/Hypromellose [Artificial Tears] 1 each OP DAILY 10/04/18 [History]


Fluticasone/Salmeterol [Advair 250-50 Diskus] 1 each IH BID 10/04/18 [History]


Insulin Detemir [Levemir Flextouch] 22 unit SQ DAILY 10/04/18 [History]


amLODIPine Besylate [Norvasc] 5 mg PO BEDTIME 10/04/18 [History]


Clindamycin HCl [Cleocin HCl] 300 mg PO QID #28 capsule 10/09/18 [Rx]


Furosemide [Lasix] 40 mg PO DAILY #30 tablet 10/09/18 [Rx]


Polyethylene Glycol 3350 [MiraLAX] 17 gm PO DAILY #30 packet 10/09/18 [Rx]








Forms:  ED Department Discharge


Referrals: 


Ruel Anderson MD [Primary Care Provider] - 





- Discharge Summary/Plan Comment


DC Time >30 min.: No


Discharge Summary/Plan Comment: 





Transfer to swing bed.  Will continue with Vancomycin and Zosyn and see he 

progresses.  





- General Info


Date of Service: 10/18/18


Admission Dx/Problem (Free Text: 





RLL Pneumonia


COPD Exacerbation


CHF


Acute on chronic CHF


Palliative care patient





Functional Status: Reports: Tolerating Diet.  Denies: Ambulating





- Review of Systems


General: Reports: Weakness, Fatigue.  Denies: Fever


Pulmonary: Reports: Shortness of Breath, Cough, Sputum


Cardiovascular: Denies: Edema


Gastrointestinal: Denies: Constipation, Diarrhea


Genitourinary: Reports: Incontinence


Neurological: Reports: Weakness


Psychiatric: Reports: Confusion





- Patient Data


Vitals - Most Recent: 


 Last Vital Signs











Temp  97.6 F   10/18/18 08:00


 


Pulse  65   10/18/18 08:00


 


Resp  20   10/18/18 08:00


 


BP  131/55 L  10/18/18 08:00


 


Pulse Ox  99   10/18/18 08:00











Weight - Most Recent: 149 lb 9.6 oz


Med Orders - Current: 


 Current Medications








Discontinued Medications





Acetaminophen (Tylenol)  650 mg PO Q4H PRN


   PRN Reason: Pain (Mild 1-3)/fever


Acetaminophen (Tylenol Extra Strength)  1,000 mg PO BID [facility]


   Last Admin: 10/18/18 07:53 Dose:  1,000 mg


Albuterol (Ventolin Hfa)  0 gm INH QID [facility]


   Last Admin: 10/15/18 08:28 Dose:  Not Given


Albuterol/Ipratropium (Duoneb 3.0-0.5 Mg/3 Ml)  3 ml NEB Q4H PRN


   PRN Reason: Shortness Of Breath/wheezing


   Last Admin: 10/16/18 17:55 Dose:  3 ml


Albuterol/Ipratropium (Duoneb 3.0-0.5 Mg/3 Ml)  3 ml NEB QIDRT [facility]


   Last Admin: 10/18/18 07:54 Dose:  3 ml


Alprazolam (Xanax)  0.25 mg PO Q6H PRN


   PRN Reason: Anxiety


   Last Admin: 10/18/18 01:19 Dose:  0.25 mg


Amlodipine Besylate (Norvasc)  5 mg PO BEDTIME [facility]


   Last Admin: 10/16/18 04:48 Dose:  Not Given


Artificial Tears (Liquitears 1.4% Ophth Soln)  0 ml EYEBOTH DAILY [facility]


   Last Admin: 10/18/18 07:59 Dose:  1 drop


Aspirin (Halfprin)  81 mg PO DAILY [facility]


   Last Admin: 10/18/18 07:52 Dose:  81 mg


Bisacodyl (Dulcolax)  5 mg PO DAILY PRN


   PRN Reason: Constipation


   Last Admin: 10/15/18 07:33 Dose:  5 mg


Ceftriaxone Sodium (Rocephin)  1 gm IVPUSH Q24H [facility]


   Last Admin: 10/15/18 02:05 Dose:  1 gm


Enoxaparin Sodium (Lovenox)  30 mg SUBCUT Q24H [facility]


   Last Admin: 10/16/18 00:36 Dose:  30 mg


Enoxaparin Sodium (Lovenox)  30 mg SUBCUT Q24H [facility]


   Last Admin: 10/18/18 07:54 Dose:  30 mg


Furosemide (Lasix)  40 mg IVPUSH BID [facility]


   Last Admin: 10/17/18 07:33 Dose:  40 mg


Furosemide (Lasix)  40 mg IVPUSH 0800,1600 [facility]


   Last Admin: 10/18/18 07:54 Dose:  40 mg


Levofloxacin/Dextrose 250 mg/ (Premix)  50 mls @ 50 mls/hr IV Q24H [facility]


   Last Admin: 10/15/18 01:36 Dose:  Not Given


Sodium Chloride (Sodium Chloride 0.45%)  1,000 mls @ 75 mls/hr IV ASDIRECTED [facility]


   Last Admin: 10/15/18 02:08 Dose:  75 mls/hr


Azithromycin 500 mg/ Sodium (Chloride)  250 mls @ 250 mls/hr IV Q24H [facility]


   Last Admin: 10/15/18 02:15 Dose:  250 mls/hr


Piperacillin Sod/Tazobactam (Sod 3.375 gm/ Sodium Chloride)  50 mls @ 100 mls/

hr IV Q6H [facility]


   Last Admin: 10/15/18 09:34 Dose:  Not Given


Piperacillin Sod/Tazobactam (Sod 2.25 gm/ Sodium Chloride)  50 mls @ 100 mls/hr 

IV Q6H [facility]


   Last Admin: 10/18/18 09:32 Dose:  100 mls/hr


Vancomycin HCl 1 gm/ Sodium (Chloride)  250 mls @ 166.667 mls/hr IV Q48H [facility]


   Last Admin: 10/16/18 10:28 Dose:  166.667 mls/hr


Insulin Detemir (Levemir)  0 unit SUBCUT DAILY [facility]


   Last Admin: 10/18/18 07:59 Dose:  22 units


Mometasone Furoate/Formoterol Fumar (Dulera 200-5 Mcg)  0 puff IH BID [facility]


   Last Admin: 10/18/18 07:59 Dose:  2 puff


Ondansetron HCl (Zofran)  4 mg IV Q4H PRN


   PRN Reason: Nausea/Vomiting


Oxyquinoline Sulfate (Bag Balm Oint)  0 oz TOP ASDIRECTED PRN


   PRN Reason: skin breakdown


   Last Admin: 10/17/18 21:34 Dose:  1 applic


Piperacillin Sod/Tazobactam Sod (Zosyn) Confirm Administered Dose 3.375 gm 

.ROUTE .STK-MED ONE


   Stop: 10/16/18 21:32


   Last Admin: 10/16/18 21:32 Dose:  Not Given


Piperacillin Sod/Tazobactam Sod (Zosyn) Confirm Administered Dose 3.375 gm 

.ROUTE .STK-MED ONE


   Stop: 10/17/18 02:51


   Last Admin: 10/17/18 06:28 Dose:  Not Given


Polyethylene Glycol (Miralax)  17 gm PO DAILY [facility]


   Last Admin: 10/16/18 07:43 Dose:  17 gm


Polyethylene Glycol (Miralax)  17 gm PO DAILY PRN


   PRN Reason: Constipation


Sodium Chloride (Saline Flush)  10 ml FLUSH ASDIRECTED PRN


   PRN Reason: Keep Vein Open


Vancomycin HCl (Pharmacy To Dose - Vancomycin)  1 dose .XX ASDIRECTED [facility]











- Exam


Quality Assessment: Reports: Supplemental Oxygen


General: Reports: Alert, Oriented (person only)


HEENT: Reports: Mucous Membr. Moist/Pink


Neck: Reports: Supple


Lungs: Reports: Decreased Breath Sounds, Crackles


Cardiovascular: Reports: Regular Rate, Regular Rhythm


GI/Abdominal Exam: Normal Bowel Sounds, Soft, Non-Tender, Distended


Extremities: Normal Inspection, No Pedal Edema


Skin: Reports: Warm, Dry


Neurological: Reports: No New Focal Deficit

## 2018-10-22 LAB
CHLORIDE SERPL-SCNC: 105 MEQ/L (ref 98–106)
SODIUM SERPL-SCNC: 144 MEQ/L (ref 136–145)

## 2018-10-22 RX ADMIN — INSULIN DETEMIR SCH UNIT: 100 INJECTION, SOLUTION SUBCUTANEOUS at 09:03

## 2018-10-22 RX ADMIN — MOMETASONE FUROATE AND FORMOTEROL FUMARATE DIHYDRATE SCH PUFF: 200; 5 AEROSOL RESPIRATORY (INHALATION) at 08:09

## 2018-10-22 RX ADMIN — MOMETASONE FUROATE AND FORMOTEROL FUMARATE DIHYDRATE SCH PUFF: 200; 5 AEROSOL RESPIRATORY (INHALATION) at 19:50

## 2018-10-22 RX ADMIN — POLYVINYL ALCOHOL SCH DROP: 14 SOLUTION/ DROPS OPHTHALMIC at 08:11

## 2018-10-22 RX ADMIN — CLINDAMYCIN PHOSPHATE SCH MLS/HR: 6 INJECTION, SOLUTION INTRAVENOUS at 16:48

## 2018-10-22 RX ADMIN — CLINDAMYCIN PHOSPHATE SCH MLS/HR: 6 INJECTION, SOLUTION INTRAVENOUS at 09:45

## 2018-10-22 RX ADMIN — CLINDAMYCIN PHOSPHATE SCH MLS/HR: 6 INJECTION, SOLUTION INTRAVENOUS at 21:48

## 2018-10-23 RX ADMIN — INSULIN DETEMIR SCH UNITS: 100 INJECTION, SOLUTION SUBCUTANEOUS at 07:38

## 2018-10-23 RX ADMIN — MOMETASONE FUROATE AND FORMOTEROL FUMARATE DIHYDRATE SCH PUFF: 200; 5 AEROSOL RESPIRATORY (INHALATION) at 07:38

## 2018-10-23 RX ADMIN — CLINDAMYCIN PHOSPHATE SCH MLS/HR: 6 INJECTION, SOLUTION INTRAVENOUS at 16:37

## 2018-10-23 RX ADMIN — POLYVINYL ALCOHOL SCH DROP: 14 SOLUTION/ DROPS OPHTHALMIC at 07:38

## 2018-10-23 RX ADMIN — CLINDAMYCIN PHOSPHATE SCH MLS/HR: 6 INJECTION, SOLUTION INTRAVENOUS at 21:40

## 2018-10-23 RX ADMIN — MOMETASONE FUROATE AND FORMOTEROL FUMARATE DIHYDRATE SCH PUFF: 200; 5 AEROSOL RESPIRATORY (INHALATION) at 20:42

## 2018-10-23 RX ADMIN — CLINDAMYCIN PHOSPHATE SCH MLS/HR: 6 INJECTION, SOLUTION INTRAVENOUS at 04:42

## 2018-10-23 RX ADMIN — CLINDAMYCIN PHOSPHATE SCH MLS/HR: 6 INJECTION, SOLUTION INTRAVENOUS at 09:36

## 2018-10-24 VITALS — DIASTOLIC BLOOD PRESSURE: 54 MMHG | SYSTOLIC BLOOD PRESSURE: 144 MMHG

## 2018-10-24 RX ADMIN — INSULIN DETEMIR SCH UNITS: 100 INJECTION, SOLUTION SUBCUTANEOUS at 08:13

## 2018-10-24 RX ADMIN — POLYVINYL ALCOHOL SCH DROP: 14 SOLUTION/ DROPS OPHTHALMIC at 08:15

## 2018-10-24 RX ADMIN — MOMETASONE FUROATE AND FORMOTEROL FUMARATE DIHYDRATE SCH PUFF: 200; 5 AEROSOL RESPIRATORY (INHALATION) at 08:16

## 2018-10-24 RX ADMIN — CLINDAMYCIN PHOSPHATE SCH MLS/HR: 6 INJECTION, SOLUTION INTRAVENOUS at 03:30

## 2018-10-24 NOTE — PCM.PN
- General Info


Date of Service: 10/24/18


Admission Dx/Problem (Free Text): 





Acute CHF


Pneumonia


Acute Renal Failure


COPD Exacerbation





Functional Status: Reports: Pain Controlled.  Denies: Tolerating Diet, 

Ambulating





- Review of Systems


General: Reports: Weakness, Fatigue, Malaise


HEENT: Reports: No Symptoms


Pulmonary: Reports: Shortness of Breath, Cough, Wheezing


Cardiovascular: Denies: Edema


Gastrointestinal: Reports: No Symptoms


Skin: Reports: No Symptoms


Neurological: Reports: Confusion, Weakness





- Patient Data


Vitals - Most Recent: 


 Last Vital Signs











Temp  97.4 F   10/24/18 08:25


 


Pulse  85   10/24/18 08:00


 


Resp  24 H  10/24/18 08:00


 


BP  144/54 H  10/24/18 08:00


 


Pulse Ox  86 L  10/24/18 08:00











Weight - Most Recent: 156 lb 14.4 oz


I&O - Last 24 Hours: 


 Intake & Output











 10/23/18 10/24/18 10/24/18





 22:59 06:59 14:59


 


Intake Total 100  


 


Balance 100  











Lab Results Last 24 Hours: 


 Laboratory Results - last 24 hr











  10/23/18 10/24/18 Range/Units





  20:26 07:46 


 


POC Glucose  184 H  164 H  ()  mg/dl











Med Orders - Current: 


 Current Medications





Acetaminophen (Tylenol)  650 mg PO Q4H PRN


   PRN Reason: Pain (Mild 1-3)/fever


Albuterol/Ipratropium (Duoneb 3.0-0.5 Mg/3 Ml)  3 ml NEB Q4H PRN


   PRN Reason: Shortness Of Breath/wheezing


   Last Admin: 10/24/18 05:14 Dose:  3 ml


Alprazolam (Xanax)  0.25 mg PO Q6H PRN


   PRN Reason: Anxiety


   Last Admin: 10/24/18 05:27 Dose:  0.25 mg


Bisacodyl (Dulcolax)  5 mg PO DAILY PRN


   PRN Reason: Constipation


Calamine/Phenol (Calmoseptine)  0 gm TOP ASDIRECTED PRN


   PRN Reason: Wound Care


   Last Admin: 10/21/18 08:49 Dose:  1 applic


Lorazepam (Ativan)  0.5 mg IVPUSH Q6H PRN


   PRN Reason: Anxiety


   Last Admin: 10/21/18 12:53 Dose:  0.5 mg


Morphine Sulfate (Morphine)  2 mg IVPUSH Q2H PRN


   PRN Reason: Anxiety


   Last Admin: 10/23/18 20:33 Dose:  2 mg


Ondansetron HCl (Zofran)  4 mg IV Q4H PRN


   PRN Reason: Nausea/Vomiting


Oxyquinoline Sulfate (Bag Balm Oint)  0 oz TOP ASDIRECTED PRN


   PRN Reason: skin breakdown


   Last Admin: 10/20/18 08:00 Dose:  1 applic


Sodium Chloride (Saline Flush)  10 ml FLUSH ASDIRECTED PRN


   PRN Reason: Keep Vein Open





Discontinued Medications





Acetaminophen (Tylenol Extra Strength)  1,000 mg PO BID [facility]


   Last Admin: 10/24/18 08:25 Dose:  1,000 mg


Albuterol/Ipratropium (Duoneb 3.0-0.5 Mg/3 Ml)  3 ml NEB QIDRT [facility]


   Last Admin: 10/24/18 08:26 Dose:  3 ml


Amlodipine Besylate (Norvasc)  5 mg PO BEDTIME [facility]


   Last Admin: 10/23/18 20:28 Dose:  5 mg


Artificial Tears (Liquitears 1.4% Ophth Soln)  0 ml EYEBOTH DAILY [facility]


   Last Admin: 10/24/18 08:15 Dose:  1 drop


Aspirin (Halfprin)  81 mg PO DAILY [facility]


   Last Admin: 10/24/18 08:25 Dose:  81 mg


Calamine/Phenol (Calmoseptine) Confirm Administered Dose 113 gm TOP .STK-MED ONE


   Stop: 10/20/18 17:15


   Last Admin: 10/20/18 17:31 Dose:  1 applic


Enoxaparin Sodium (Lovenox)  30 mg SUBCUT DAILY [facility]


   Last Admin: 10/24/18 08:25 Dose:  30 mg


Furosemide (Lasix)  40 mg IVPUSH DAILY [facility]


   Last Admin: 10/24/18 08:25 Dose:  40 mg


Piperacillin Sod/Tazobactam (Sod 2.25 gm/ Sodium Chloride)  50 mls @ 100 mls/hr 

IV Q6H [facility]


   Last Admin: 10/22/18 09:07 Dose:  100 mls/hr


Vancomycin HCl 1 gm/ Sodium (Chloride)  250 mls @ 166.667 mls/hr IV Q48H [facility]


   Last Admin: 10/22/18 09:07 Dose:  166.667 mls/hr


Azithromycin 500 mg/ Sodium (Chloride)  250 mls @ 250 mls/hr IV Q24H [facility]


   Last Admin: 10/23/18 09:50 Dose:  250 mls/hr


Clindamycin Phosphate 300 mg/ (Premix)  50 mls @ 100 mls/hr IV Q6H [facility]


   Last Admin: 10/24/18 03:30 Dose:  100 mls/hr


Insulin Detemir (Levemir)  22 unit SUBCUT DAILY [facility]


   Last Admin: 10/22/18 09:03 Dose:  10 unit


Insulin Detemir (Levemir)  10 unit SUBCUT DAILY [facility]


   Last Admin: 10/24/18 08:13 Dose:  10 units


Mometasone Furoate/Formoterol Fumar (Dulera 200-5 Mcg)  0 puff IH BID [facility]


   Last Admin: 10/24/18 08:16 Dose:  2 puff


Polyethylene Glycol (Miralax)  17 gm PO DAILY PRN


   PRN Reason: Constipation


Sodium Chloride (Saline Flush)  10 ml FLUSH ASDIRECTED PRN


   PRN Reason: Keep Vein Open


Vancomycin HCl (Pharmacy To Dose - Vancomycin)  1 dose .XX ASDIRECTED [facility]











- Exam


Quality Assessment: Supplemental Oxygen


General: Sedated


Neck: Supple


Lungs: Decreased Breath Sounds, Crackles


Cardiovascular: Regular Rate, Regular Rhythm


GI/Abdominal Exam: Normal Bowel Sounds, Soft, Non-Tender


Extremities: Normal Inspection, No Pedal Edema


Skin: Warm, Dry


Neurological: No New Focal Deficit





- Problem List & Annotations


(1) Comfort measures only status


SNOMED Code(s): 07419202408391


   Code(s): Z51.5 - ENCOUNTER FOR PALLIATIVE CARE   Status: Acute   Current 

Visit: Yes   





(2) CHF, Congestive heart failure


SNOMED Code(s): 43226094


   Code(s): I50.9 - HEART FAILURE, UNSPECIFIED   Status: Acute   Priority: High

   Current Visit: No   Annotation/Comment:: Acute on Chronic Systolic 

Congestive Heart Failure


   





(3) COPD (chronic obstructive pulmonary disease)


SNOMED Code(s): 41887640


   Code(s): J44.9 - CHRONIC OBSTRUCTIVE PULMONARY DISEASE, UNSPECIFIED   Status

: Acute   Priority: High   Current Visit: No   


Qualifiers: 


 





(4) Palliative care patient


SNOMED Code(s): 568850258


   Code(s): Z51.5 - ENCOUNTER FOR PALLIATIVE CARE   Status: Acute   Priority: 

High   Current Visit: No   





(5) Pneumonia


SNOMED Code(s): 049502760


   Code(s): J18.9 - PNEUMONIA, UNSPECIFIED ORGANISM   Status: Acute   Priority: 

High   Current Visit: No   





(6) Renal failure (ARF), acute on chronic


SNOMED Code(s): 566366535


   Code(s): N17.9 - ACUTE KIDNEY FAILURE, UNSPECIFIED; N18.9 - CHRONIC KIDNEY 

DISEASE, UNSPECIFIED   Status: Acute   Priority: High   Current Visit: No   





(7) CAD (coronary artery disease)


SNOMED Code(s): 54160045


   Code(s): I25.10 - ATHSCL HEART DISEASE OF NATIVE CORONARY ARTERY W/O ANG 

PCTRS   Status: Chronic   Priority: Medium   Current Visit: No   





- Problem List Review


Problem List Initiated/Reviewed/Updated: Yes





- My Orders


Last 24 Hours: 


My Active Orders





10/24/18 09:16


Comfort Measures [OM.PC] Routine 














- Assessment


Assessment:: 





End Stage COPD


Comfort Care Status








- Plan


Plan:: 





Patient has continued to decline in status despite ongoing IV antibiotics, Lasix

, nebs and oxygen.  Did contact Tylor, patient's son and POA as we have had 

several conversations about status and not responding to therapy.  Had family 

meeting earlier about possibly comfort cares if continued to decline.  They are 

in agreement that at this point, measures have not been successful.  Will 

switch to comfort care only status. Stop all meds except Morphine and Xanax and 

Ativan as needed.  Nebs as needed.  Family in agreement with this plan.
